# Patient Record
Sex: MALE | Race: WHITE | NOT HISPANIC OR LATINO | Employment: FULL TIME | URBAN - NONMETROPOLITAN AREA
[De-identification: names, ages, dates, MRNs, and addresses within clinical notes are randomized per-mention and may not be internally consistent; named-entity substitution may affect disease eponyms.]

---

## 2021-03-16 ENCOUNTER — IMMUNIZATION (OUTPATIENT)
Dept: VACCINE CLINIC | Facility: HOSPITAL | Age: 56
End: 2021-03-16

## 2021-03-16 PROCEDURE — 91300 HC SARSCOV02 VAC 30MCG/0.3ML IM: CPT | Performed by: INTERNAL MEDICINE

## 2021-03-16 PROCEDURE — 0001A: CPT | Performed by: INTERNAL MEDICINE

## 2021-04-06 ENCOUNTER — IMMUNIZATION (OUTPATIENT)
Dept: VACCINE CLINIC | Facility: HOSPITAL | Age: 56
End: 2021-04-06

## 2021-04-06 PROCEDURE — 91300 HC SARSCOV02 VAC 30MCG/0.3ML IM: CPT | Performed by: INTERNAL MEDICINE

## 2021-04-06 PROCEDURE — 0002A: CPT | Performed by: INTERNAL MEDICINE

## 2022-04-15 ENCOUNTER — OFFICE VISIT (OUTPATIENT)
Dept: INTERNAL MEDICINE | Facility: CLINIC | Age: 57
End: 2022-04-15

## 2022-04-15 VITALS
BODY MASS INDEX: 26.92 KG/M2 | TEMPERATURE: 97.3 F | SYSTOLIC BLOOD PRESSURE: 130 MMHG | HEART RATE: 91 BPM | HEIGHT: 70 IN | OXYGEN SATURATION: 97 % | WEIGHT: 188 LBS | DIASTOLIC BLOOD PRESSURE: 82 MMHG

## 2022-04-15 DIAGNOSIS — E78.5 HYPERLIPIDEMIA, UNSPECIFIED HYPERLIPIDEMIA TYPE: ICD-10-CM

## 2022-04-15 DIAGNOSIS — I10 ESSENTIAL HYPERTENSION: ICD-10-CM

## 2022-04-15 DIAGNOSIS — E11.65 TYPE 2 DIABETES MELLITUS WITH HYPERGLYCEMIA, WITHOUT LONG-TERM CURRENT USE OF INSULIN: ICD-10-CM

## 2022-04-15 DIAGNOSIS — N52.9 ERECTILE DYSFUNCTION, UNSPECIFIED ERECTILE DYSFUNCTION TYPE: Primary | ICD-10-CM

## 2022-04-15 DIAGNOSIS — Z12.11 COLON CANCER SCREENING: ICD-10-CM

## 2022-04-15 PROCEDURE — 99204 OFFICE O/P NEW MOD 45 MIN: CPT | Performed by: STUDENT IN AN ORGANIZED HEALTH CARE EDUCATION/TRAINING PROGRAM

## 2022-04-15 RX ORDER — AMLODIPINE AND VALSARTAN 5; 320 MG/1; MG/1
1 TABLET ORAL DAILY
Qty: 90 TABLET | Refills: 1 | Status: SHIPPED | OUTPATIENT
Start: 2022-04-15 | End: 2022-08-15 | Stop reason: SDUPTHER

## 2022-04-15 RX ORDER — TADALAFIL 10 MG/1
10 TABLET ORAL DAILY PRN
Qty: 30 TABLET | Refills: 1 | Status: SHIPPED | OUTPATIENT
Start: 2022-04-15 | End: 2022-07-05 | Stop reason: SDUPTHER

## 2022-04-15 RX ORDER — TADALAFIL 5 MG/1
TABLET ORAL
COMMUNITY
End: 2022-04-15

## 2022-04-15 RX ORDER — ROSUVASTATIN CALCIUM 10 MG/1
10 TABLET, COATED ORAL DAILY
COMMUNITY

## 2022-04-15 RX ORDER — SITAGLIPTIN AND METFORMIN HYDROCHLORIDE 1000; 50 MG/1; MG/1
1 TABLET, FILM COATED, EXTENDED RELEASE ORAL DAILY
COMMUNITY
End: 2022-08-15 | Stop reason: SDUPTHER

## 2022-04-15 RX ORDER — AMLODIPINE AND VALSARTAN 5; 320 MG/1; MG/1
TABLET ORAL
COMMUNITY
Start: 2022-03-01 | End: 2022-04-15 | Stop reason: SDUPTHER

## 2022-04-15 NOTE — PROGRESS NOTES
Bakari Quevedo D.O.  Internal Medicine  [facility]  [address]  [place] [address]  [phone]      Chief Complaint  Establish Care, Diabetes, Hypertension, and Hyperlipidemia    SUBJECTIVE    History of Present Illness    Zenon Kahn is a 56 y.o. male who presents to the office today as a new patient to establish care.     Pt previously saw Dr Alisa Tamayo who was a [facility] medicine physician, last visit early 2021.     HTN: takes amlodipine - valsartan 5-320 mg daily, checks his BP at home once weekly and gets numbers 130 or lower systolic and 80 or lower diastolic.    Type 2 Diabetes: at least had for 10-12 years, checks his blood sugar one time weekly at home. Takes empagliflozin 25 mg daily and Janumet XR  one in the morning and one in the evening. Last year his A1c was 8.2 and empagliflozin was added at that time.     HLD: taking rosuvastatin 10 mg daily for primary prevention    Erectile dysfunction: takes tadalafil 5 mg every day but doesn't feel that he always gets a complete response, is interested if a higher dose is available.     Left femur enchondroma: follows with [facility] and they are monitoring it with periodic xray      No Known Allergies     Outpatient Medications Marked as Taking for the 4/15/22 encounter (Office Visit) with Bakari Quevedo, DO   Medication Sig Dispense Refill   • amLODIPine-valsartan (EXFORGE) 5-320 MG per tablet Take 1 tablet by mouth Daily. 90 tablet 1   • empagliflozin (JARDIANCE) 25 MG tablet tablet Take 1 tablet by mouth Daily. 30 tablet 2   • glucose blood test strip Accu-Chek Thalia Plus test strips     • rosuvastatin (CRESTOR) 10 MG tablet rosuvastatin 10 mg tablet     • SITagliptin-metFORMIN HCl ER (Janumet XR)  MG tablet Janumet XR 50 mg-1,000 mg tablet,extended release     • [DISCONTINUED] amLODIPine-valsartan (EXFORGE) 5-320 MG per tablet      • [DISCONTINUED] empagliflozin (JARDIANCE) 25 MG  "tablet tablet Jardiance 25 mg tablet     • [DISCONTINUED] tadalafil (CIALIS) 5 MG tablet tadalafil 5 mg tablet          Past Medical History:   Diagnosis Date   • Enchondroma of left femur    • Erectile dysfunction    • Hyperlipidemia    • Hypertension    • Neuroblastoma (HCC)     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 He has never used smokeless tobacco. He reports current alcohol use of about 6.0 standard drinks of alcohol per week. He reports that he does not use drugs.    OBJECTIVE    Vital Signs:   /82   Pulse 91   Temp 97.3 °F (36.3 °C) (Temporal)   Ht 177.8 cm (70\")   Wt 85.3 kg (188 lb)   SpO2 97%   BMI 26.98 kg/m²     Physical Exam  Vitals reviewed.   Constitutional:       General: He is not in acute distress.     Appearance: Normal appearance. He is not ill-appearing.   HENT:      Head: Atraumatic.   Eyes:      General: No scleral icterus.  Cardiovascular:      Rate and Rhythm: Normal rate and regular rhythm.      Heart sounds: Normal heart sounds. No murmur heard.  Pulmonary:      Effort: Pulmonary effort is normal. No respiratory distress.      Breath sounds: Normal breath sounds. No wheezing or rhonchi.   Musculoskeletal:      Right lower leg: No edema.      Left lower leg: No edema.   Neurological:      Mental Status: He is alert.   Psychiatric:         Mood and Affect: Mood normal.         Behavior: Behavior normal.         Thought Content: Thought content normal.                             ASSESSMENT & PLAN     Diagnoses and all orders for this visit:    1. Erectile dysfunction, " unspecified erectile dysfunction type (Primary)  -Currently taking daily tadalafil 5 mg with partial but incomplete response.  -Advised patient that we could switch to a as needed dosage of higher dosage of 10 mg at least 30 minutes prior to sexual activity and that he may notice a benefit of this medicine 24 to 36 hours beyond taking it.  He is agreeable to try this so I will call in tadalafil 10 mg daily.  Continue to monitor.  -     tadalafil (Cialis) 10 MG tablet; Take 1 tablet by mouth Daily As Needed for Erectile Dysfunction. Take at least 30 minutes prior to anticipated sexual activity as one single dose and not more than once daily.  Dispense: 30 tablet; Refill: 1    2. Type 2 diabetes mellitus with hyperglycemia, without long-term current use of insulin ([facility])  -reports last A1c one year ago at 8.2  -Goal A1c for this patient is less than 6.5%  -Current diabetes regimen: Empagliflozin 25 mg daily and Janumet XR  mg twice daily.  -Summary of patient's most recent blood glucose trends at home: He checks blood sugar randomly about once per week and gets numbers in the 150s to 200s.  -Changes made to diabetes regimen today: I will obtain an A1c today and further changes to his regimen will be based off that  -Microalbuminuria screen: Obtained today    3. Essential hypertension  -Currently taking amlodipine-valsartan 5-320 mg daily  -/82 in office today and at home he reports control at least is good or better.  Continue current regimen.  -Recheck renal function and electrolytes today.  -     amLODIPine-valsartan (EXFORGE) 5-320 MG per tablet; Take 1 tablet by mouth Daily.  Dispense: 90 tablet; Refill: 1    4. Hyperlipidemia, unspecified hyperlipidemia type  -Currently taking rosuvastatin 10 mg daily for primary prevention.  Repeat fasting lipid profile today at.  -     Lipid Panel With LDL/HDL Ratio            The following social determinates of health impact the patient's medical decision making:  No social determinates of health were factored in to today's visit.     Follow Up  Return in about 4 weeks (around 5/13/2022) for Annual physical.    Patient/family had no further questions at this time and verbalized understanding of the plan discussed today.

## 2022-04-16 LAB
ALBUMIN SERPL-MCNC: 4.5 G/DL (ref 3.8–4.9)
ALBUMIN/CREAT UR: 17 MG/G CREAT (ref 0–29)
ALBUMIN/GLOB SERPL: 2 {RATIO} (ref 1.2–2.2)
ALP SERPL-CCNC: 75 IU/L (ref 44–121)
ALT SERPL-CCNC: 15 IU/L (ref 0–44)
AST SERPL-CCNC: 14 IU/L (ref 0–40)
BASOPHILS # BLD AUTO: 0.1 X10E3/UL (ref 0–0.2)
BASOPHILS NFR BLD AUTO: 1 %
BILIRUB SERPL-MCNC: 0.7 MG/DL (ref 0–1.2)
BUN SERPL-MCNC: 10 MG/DL (ref 6–24)
BUN/CREAT SERPL: 11 (ref 9–20)
CALCIUM SERPL-MCNC: 9.3 MG/DL (ref 8.7–10.2)
CHLORIDE SERPL-SCNC: 94 MMOL/L (ref 96–106)
CHOLEST SERPL-MCNC: 161 MG/DL (ref 100–199)
CO2 SERPL-SCNC: 20 MMOL/L (ref 20–29)
CREAT SERPL-MCNC: 0.9 MG/DL (ref 0.76–1.27)
CREAT UR-MCNC: 56.4 MG/DL
EGFRCR SERPLBLD CKD-EPI 2021: 100 ML/MIN/1.73
EOSINOPHIL # BLD AUTO: 0.2 X10E3/UL (ref 0–0.4)
EOSINOPHIL NFR BLD AUTO: 3 %
ERYTHROCYTE [DISTWIDTH] IN BLOOD BY AUTOMATED COUNT: 12.7 % (ref 11.6–15.4)
GLOBULIN SER CALC-MCNC: 2.2 G/DL (ref 1.5–4.5)
GLUCOSE SERPL-MCNC: 169 MG/DL (ref 65–99)
HBA1C MFR BLD: 8.3 % (ref 4.8–5.6)
HCT VFR BLD AUTO: 44.6 % (ref 37.5–51)
HDLC SERPL-MCNC: 61 MG/DL
HGB BLD-MCNC: 15.3 G/DL (ref 13–17.7)
IMM GRANULOCYTES # BLD AUTO: 0 X10E3/UL (ref 0–0.1)
IMM GRANULOCYTES NFR BLD AUTO: 1 %
LDLC SERPL CALC-MCNC: 83 MG/DL (ref 0–99)
LDLC/HDLC SERPL: 1.4 RATIO (ref 0–3.6)
LYMPHOCYTES # BLD AUTO: 1.3 X10E3/UL (ref 0.7–3.1)
LYMPHOCYTES NFR BLD AUTO: 21 %
MCH RBC QN AUTO: 31 PG (ref 26.6–33)
MCHC RBC AUTO-ENTMCNC: 34.3 G/DL (ref 31.5–35.7)
MCV RBC AUTO: 91 FL (ref 79–97)
MICROALBUMIN UR-MCNC: 9.7 UG/ML
MONOCYTES # BLD AUTO: 0.7 X10E3/UL (ref 0.1–0.9)
MONOCYTES NFR BLD AUTO: 11 %
NEUTROPHILS # BLD AUTO: 4 X10E3/UL (ref 1.4–7)
NEUTROPHILS NFR BLD AUTO: 63 %
PLATELET # BLD AUTO: 222 X10E3/UL (ref 150–450)
POTASSIUM SERPL-SCNC: 5.1 MMOL/L (ref 3.5–5.2)
PROT SERPL-MCNC: 6.7 G/DL (ref 6–8.5)
RBC # BLD AUTO: 4.93 X10E6/UL (ref 4.14–5.8)
SODIUM SERPL-SCNC: 133 MMOL/L (ref 134–144)
TRIGL SERPL-MCNC: 91 MG/DL (ref 0–149)
VLDLC SERPL CALC-MCNC: 17 MG/DL (ref 5–40)
WBC # BLD AUTO: 6.3 X10E3/UL (ref 3.4–10.8)

## 2022-04-16 RX ORDER — GLIPIZIDE 5 MG/1
5 TABLET ORAL
Qty: 180 TABLET | Refills: 1 | Status: SHIPPED | OUTPATIENT
Start: 2022-04-16 | End: 2022-05-20

## 2022-04-22 ENCOUNTER — PATIENT ROUNDING (BHMG ONLY) (OUTPATIENT)
Dept: INTERNAL MEDICINE | Facility: CLINIC | Age: 57
End: 2022-04-22

## 2022-04-22 NOTE — PROGRESS NOTES
April 22, 2022      LYAA JIMENEZ [facility]  [address] [address] [phone].        Information collected during check out    Overall were you satisfied with your first visit to our practice? Yes       I appreciate you taking the time to speak with me today. Is there anything else I can do for you? No      Thank you, and have a great day.

## 2022-04-27 ENCOUNTER — TELEPHONE (OUTPATIENT)
Dept: INTERNAL MEDICINE | Facility: CLINIC | Age: 57
End: 2022-04-27

## 2022-04-27 NOTE — TELEPHONE ENCOUNTER
Caller: Zenon Kahn    Relationship: Self    Best call back number: [phone]    What orders are you requesting (i.e. lab or imaging): COLORGUARD    In what timeframe would the patient need to come in:     Where will you receive your lab/imaging services:     Additional notes: PATIENT WAS IN TO SEE DR CARVER AND SPOKE ABOUT A COLORGUARD TEST OR A COLONOSCOPY.  PATIENT WANTS TO DO THE COLORGUARD TESTING.

## 2022-04-27 NOTE — TELEPHONE ENCOUNTER
Caller: Zenon Kahn    Relationship: Self    Best call back number: [phone]    Requested Prescriptions:   Requested Prescriptions     Pending Prescriptions Disp Refills   • glucose blood test strip       Sig: Use as instructed        Pharmacy where request should be sent:   [facility]  [address] [place] [address]  [phone]    Additional details provided by patient: PT SAYS THIS NEEDS TO BE FOR THE ACCU CHECK GUIDE TESTING DEVICE    Does the patient have less than a 3 day supply:  [] Yes  [x] No    Александр Salazar Rep   04/27/22 13:48 EDT

## 2022-04-28 DIAGNOSIS — E11.65 TYPE 2 DIABETES MELLITUS WITH HYPERGLYCEMIA, WITHOUT LONG-TERM CURRENT USE OF INSULIN: Primary | ICD-10-CM

## 2022-04-28 DIAGNOSIS — Z12.11 COLON CANCER SCREENING: Primary | ICD-10-CM

## 2022-05-19 ENCOUNTER — TELEPHONE (OUTPATIENT)
Dept: INTERNAL MEDICINE | Facility: CLINIC | Age: 57
End: 2022-05-19

## 2022-05-19 DIAGNOSIS — R19.5 POSITIVE COLORECTAL CANCER SCREENING USING COLOGUARD TEST: Primary | ICD-10-CM

## 2022-05-19 NOTE — TELEPHONE ENCOUNTER
Caller: Zenon Kahn    Relationship: Self    Best call back number: [phone]    What is the medical concern/diagnosis: COLOGUARD POSITIVE    What specialty or service is being requested: COLONOSCOPY ASAP

## 2022-05-20 ENCOUNTER — OFFICE VISIT (OUTPATIENT)
Dept: INTERNAL MEDICINE | Facility: CLINIC | Age: 57
End: 2022-05-20

## 2022-05-20 VITALS
TEMPERATURE: 97.8 F | HEIGHT: 70 IN | HEART RATE: 112 BPM | BODY MASS INDEX: 25.48 KG/M2 | DIASTOLIC BLOOD PRESSURE: 90 MMHG | WEIGHT: 178 LBS | OXYGEN SATURATION: 95 % | SYSTOLIC BLOOD PRESSURE: 132 MMHG

## 2022-05-20 DIAGNOSIS — Z00.00 ANNUAL PHYSICAL EXAM: Primary | ICD-10-CM

## 2022-05-20 DIAGNOSIS — Z12.5 PROSTATE CANCER SCREENING: ICD-10-CM

## 2022-05-20 DIAGNOSIS — F41.9 ANXIETY AND DEPRESSION: ICD-10-CM

## 2022-05-20 DIAGNOSIS — F32.A ANXIETY AND DEPRESSION: ICD-10-CM

## 2022-05-20 DIAGNOSIS — E11.65 TYPE 2 DIABETES MELLITUS WITH HYPERGLYCEMIA, WITHOUT LONG-TERM CURRENT USE OF INSULIN: ICD-10-CM

## 2022-05-20 DIAGNOSIS — I10 ESSENTIAL HYPERTENSION: ICD-10-CM

## 2022-05-20 PROCEDURE — 93000 ELECTROCARDIOGRAM COMPLETE: CPT | Performed by: STUDENT IN AN ORGANIZED HEALTH CARE EDUCATION/TRAINING PROGRAM

## 2022-05-20 PROCEDURE — 99214 OFFICE O/P EST MOD 30 MIN: CPT | Performed by: STUDENT IN AN ORGANIZED HEALTH CARE EDUCATION/TRAINING PROGRAM

## 2022-05-20 PROCEDURE — 99396 PREV VISIT EST AGE 40-64: CPT | Performed by: STUDENT IN AN ORGANIZED HEALTH CARE EDUCATION/TRAINING PROGRAM

## 2022-05-20 RX ORDER — AMITRIPTYLINE HYDROCHLORIDE 25 MG/1
25 TABLET, FILM COATED ORAL NIGHTLY
Qty: 30 TABLET | Refills: 2 | Status: SHIPPED | OUTPATIENT
Start: 2022-05-20 | End: 2022-08-24

## 2022-05-20 RX ORDER — GLIPIZIDE 5 MG/1
5 TABLET ORAL
Qty: 180 TABLET | Refills: 1
Start: 2022-05-20 | End: 2022-08-24

## 2022-05-20 NOTE — PROGRESS NOTES
Bakari Quevedo D.O.  Internal Medicine  [facility]  [address], [address]  [place] [address]  [phone]    Chief Complaint  Annual checkup    HISTORY    Zenon Kahn is a 56 y.o. male who presents to the office today as a  an established patient for their annual preventative exam.      No hospitalization(s) within the last year.     Current exercise regimen: walks his dog and just got his row bike back from repairs    Status of chronic medical conditions:    HTN: takes amlodipine - valsartan 5-320 mg daily. States he is under a tremendous amount of stress. Checks his BP 3 times weekly with 128-133 systolic , diastolic in the 80s usually.      Type 2 Diabetes:  Takes empagliflozin 25 mg daily and Janumet XR  one in the morning and one in the evening. Last A1c 4/2022 was 8.3 so glipizide 5 mg twice daily before meals was added but had sweating, a little dizziness , cold sweats after the evening pill. So he stopped taking it after 4 days.     HLD: taking rosuvastatin 10 mg daily for primary prevention     Erectile dysfunction: takes tadalafil 10 mg daily as needed     Left femur enchondroma: follows with [facility] and they are monitoring it with periodic xray     Anxiety and Depression: remembers being on wellbutrin, trintellix, buspirone, zoloft. Had side effects of yawning around 4 PM every day with many of those medications and the others didn't significantly work. States he still feels pretty bad in terms of his mood and anxiety, recently finalized divorce and that is also weighing heavily on him. States he has thought about reaching out to counselor and wants to see if I have any recommendations. Is also open to trying another medication.       Any other concerns regarding their health today:     Health Maintenance Summary          Overdue - Pneumococcal Vaccine 0-64 (1 - PCV) Overdue - never done    No completion history exists for this topic.          Overdue  - Hepatitis B (1 of 3 - Risk 3-dose series) Overdue - never done    No completion history exists for this topic.          Overdue - ZOSTER VACCINE (1 of 2) Overdue since 10/8/2015    12/17/2020  Imm Admin: Zoster, Unspecified    08/20/2020  Imm Admin: Zoster, Unspecified          Overdue - DIABETIC FOOT EXAM (Yearly) Overdue - never done    No completion history exists for this topic.          Ordered - DIABETIC EYE EXAM (Yearly) Ordered on 5/20/2022    No completion history exists for this topic.          INFLUENZA VACCINE (Yearly - August to March) Next due on 8/1/2022 02/28/2022  Imm Admin: Flublok 18+yrs    11/28/2021  Imm Admin: Flublok 18+yrs    12/17/2020  Imm Admin: Influenza, Unspecified    01/31/2019  Imm Admin: Flu Vaccine Split Quad    01/31/2019  Imm Admin: FluLaval/Fluarix/Fluzone >6          HEMOGLOBIN A1C (Every 6 Months) Next due on 10/15/2022    04/15/2022  Hemoglobin A1C component of Hemoglobin A1c    07/30/2020  Hemoglobin A1C component of HEMOGLOBIN A1C    01/31/2019  Hemoglobin A1C component of HEMOGLOBIN A1C    10/27/2014  Hemoglobin A1C component of Hemoglobin A1c    05/13/2014  Hemoglobin A1C component of Hemoglobin A1c          LIPID PANEL (Yearly) Next due on 4/15/2023    04/15/2022  Lipid Panel With LDL/HDL Ratio    07/31/2020  LIPID PANEL    01/31/2019  LIPID PANEL    10/27/2014  Lipid panel    05/13/2014  Lipid panel          URINE MICROALBUMIN (Yearly) Next due on 4/15/2023    04/15/2022  Multiple components of Microalbumin / Creatinine Urine Ratio - Urine, Clean Catch    10/27/2014  Multiple components of Microalbumin / creatinine urine ratio    05/13/2014  Multiple components of Microalbumin / creatinine urine ratio          ANNUAL PHYSICAL (Yearly) Next due on 5/21/2023 05/20/2022  Done          COLORECTAL CANCER SCREENING (COLOGUARD - Every 3 Years) Next due on 5/19/2025 05/19/2022  Visit Dx: Positive colorectal cancer screening using Cologuard test          TDAP/TD  VACCINES (2 - Td or Tdap) Next due on 2/7/2029 02/07/2019  Imm Admin: Tdap          HEPATITIS C SCREENING  Completed    01/31/2019  HEPATITIS C ANTIBODY          COVID-19 Vaccine (Series Information) Completed    10/07/2021  Imm Admin: COVID-19 (PFIZER) PURPLE CAP    04/06/2021  Imm Admin: COVID-19 (PFIZER) PURPLE CAP    03/16/2021  Imm Admin: COVID-19 (PFIZER) PURPLE CAP                 No Known Allergies     Outpatient Medications Marked as Taking for the 5/20/22 encounter (Office Visit) with Bakari Quevedo DO   Medication Sig Dispense Refill   • amLODIPine-valsartan (EXFORGE) 5-320 MG per tablet Take 1 tablet by mouth Daily. 90 tablet 1   • empagliflozin (JARDIANCE) 25 MG tablet tablet Take 1 tablet by mouth Daily. 30 tablet 2   • glipizide (GLUCOTROL) 5 MG tablet Take 1 tablet by mouth Every Morning Before Breakfast. Take 30 minutes before eating. 180 tablet 1   • glucose blood test strip Accu-Chek Thalia Plus test strips     • glucose blood test strip ACCU CHECK GUIDE strips. Use to check blood glucose up to 2 times daily. 100 each 11   • rosuvastatin (CRESTOR) 10 MG tablet rosuvastatin 10 mg tablet     • SITagliptin-metFORMIN HCl ER (Janumet XR)  MG tablet Janumet XR 50 mg-1,000 mg tablet,extended release     • tadalafil (Cialis) 10 MG tablet Take 1 tablet by mouth Daily As Needed for Erectile Dysfunction. Take at least 30 minutes prior to anticipated sexual activity as one single dose and not more than once daily. 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   • Type 2 diabetes mellitus, without long-term current use of insulin (HCC)        Immunization History   Administered Date(s) Administered   • COVID-19 (PFIZER) PURPLE CAP 03/16/2021, 04/06/2021, 10/07/2021   • Flu Vaccine Split Quad 01/31/2019   •  "FluLaval/Fluarix/Fluzone >6 01/31/2019   • Flublok 18+yrs 11/28/2021, 02/28/2022   • Hepatitis A 08/20/2020   • Influenza, Unspecified 12/17/2020   • Tdap 02/07/2019   • Zoster, Unspecified 08/20/2020, 12/17/2020        OBJECTIVE    Vital Signs:   /90   Pulse 112   Temp 97.8 °F (36.6 °C) (Temporal)   Ht 177.8 cm (70\")   Wt 80.7 kg (178 lb)   SpO2 95%   BMI 25.54 kg/m²     Physical Exam  Constitutional:       General: He is not in acute distress.     Appearance: Normal appearance. He is not ill-appearing.   HENT:      Head: Normocephalic and atraumatic.      Right Ear: Tympanic membrane, ear canal and external ear normal. There is no impacted cerumen.      Left Ear: Tympanic membrane, ear canal and external ear normal. There is no impacted cerumen.      Mouth/Throat:      Mouth: Mucous membranes are moist.      Pharynx: No oropharyngeal exudate or posterior oropharyngeal erythema.   Eyes:      General: No scleral icterus.     Extraocular Movements: Extraocular movements intact.      Conjunctiva/sclera: Conjunctivae normal.      Pupils: Pupils are equal, round, and reactive to light.   Cardiovascular:      Rate and Rhythm: Normal rate and regular rhythm.      Heart sounds: Normal heart sounds. No murmur heard.  Pulmonary:      Effort: Pulmonary effort is normal. No respiratory distress.      Breath sounds: Normal breath sounds. No wheezing.   Abdominal:      General: Bowel sounds are normal. There is no distension.      Palpations: Abdomen is soft.      Tenderness: There is no abdominal tenderness. There is no guarding.   Musculoskeletal:      Cervical back: Neck supple. No tenderness.      Right lower leg: No edema.      Left lower leg: No edema.   Lymphadenopathy:      Cervical: No cervical adenopathy.   Skin:     General: Skin is warm and dry.      Coloration: Skin is not jaundiced.   Neurological:      General: No focal deficit present.      Mental Status: He is alert and oriented to person, place, and " time.      Cranial Nerves: No cranial nerve deficit.      Motor: No weakness.   Psychiatric:         Mood and Affect: Mood normal.         Behavior: Behavior normal.         Thought Content: Thought content normal.          The following data was reviewed by: Bakari Quevedo DO on 05/20/2022:  Common labs    Common Labsle 4/15/22 4/15/22 4/15/22 4/15/22 4/15/22    1118 1118 1118 1118 1118   Glucose   169 (A)     BUN   10     Creatinine   0.90     Sodium   133 (A)     Potassium   5.1     Chloride   94 (A)     Calcium   9.3     Total Protein   6.7     Albumin   4.5     Total Bilirubin   0.7     Alkaline Phosphatase   75     AST (SGOT)   14     ALT (SGPT)   15     WBC    6.3    Hemoglobin    15.3    Hematocrit    44.6    Platelets    222    Total Cholesterol     161   Triglycerides     91   HDL Cholesterol     61   LDL Cholesterol      83   Hemoglobin A1C  8.3 (A)      Microalbumin, Urine 9.7       (A) Abnormal value       Comments are available for some flowsheets but are not being displayed.                      ECG 12 Lead    Date/Time: 5/20/2022 10:42 AM  Performed by: Bakari Quevedo DO  Authorized by: Bakari Quevedo DO   Previous ECG: no previous ECG available  Rhythm: sinus rhythm  Rate: normal  Rate comments: 92  Conduction: conduction normal  QRS axis: left  Other findings: early repolarization    Clinical impression: abnormal EKG  Clinical impression comment: sinus rhythm with Left axis deviation and early repolarization                ASSESSMENT & PLAN     1. Annual Preventative Health Examination  -Age and sex appropriate physical exam performed and documented. Updated past medical, family, social and surgical histories as well as allergies and care team list. Addressed care gaps listed in the medical record.  -Encouraged seat belt use for every car ride for patient and all occupants. Encouraged sunscreen use to reduce risk of skin cancer for any days with sun exposure over 20 minutes.  Discussed the importance of  smoke and carbon monoxide detectors in the home.   -Encouraged annual dental and vision exams as part of their overall health.  -Encouraged minimum of 30 minutes or more of exercise at a brisk walk or higher 5 days per week combined with a well-balanced diet.   -Immunizations reviewed and updated in EMR. Recommended the following vaccines for the patient: Pneumonia vaccine, patient prefers to do next visit.   -Lipid screening:   Lipid Panel    Lipid Panel 4/15/22   Total Cholesterol 161   Triglycerides 91   HDL Cholesterol 61   VLDL Cholesterol 17   LDL Cholesterol  83   LDL/HDL Ratio 1.4      Comments are available for some flowsheets but are not being displayed.          Patient is already on statin therapy.   -Aspirin for primary or secondary prevention: ASCVD risk calculate and aspirin for primary prevention is not indicated.  -Depression screening: known history of anxiety and depression  -Diabetes screening:  Patient already has a diagnosis of diabetes mellitus and is being treated.   -Tobacco use screening: Patient denies cigarette use. Tobacco counseling was was not indicated.  -Alcohol use screening: Patient reports drinking 6 drinks per week.. Alcohol abuse counseling was was not indicated.  -Illicit drug screening: Patient does not use illicit drugs.   -Abdominal aortic aneurysm screening: AAA screening is not indicated as patient is less than 65 years of age.  -Hypertension screening: Patient with known diagnosis of hypertension and is receiving treatment.  -HIV screening: Discussed with patient the importance of identifying asymptomatic HIV infection for adults in their age group with a one time screening test .Patient declined screening.   -Hepatitis C virus screening:  Patient has already completed Hepatitis C screening. Negative screening on file.   -Syphilis screening: Syphilis screening not indicated.  -Hepatitis B virus screening: Screening not indicated, not in a high-risk group.  -Colon cancer  screening: + cologuard , colonoscopy pending  -Lung cancer screening: Patient has never smoked.  -Prostate cancer screening: Last PSA test was 2.41  on 1/2019 and re-screening is indicated every 2 years. Repeat today.      Follow up in 1 year for annual physical exam.    A problem-based visit was also conducted on the same day, see below for assessment and plan    Diagnoses and all orders for this visit:    1. Type 2 diabetes mellitus with hyperglycemia, without long-term current use of insulin (HCC) (Primary)  -Takes empagliflozin 25 mg daily and Janumet XR  one in the morning and one in the evening. Last A1c 4/2022 was 8.3 so glipizide 5 mg twice daily before meals was added but had sweating, a little dizziness , cold sweats after the evening pill. So he stopped taking it after 4 days.   -symptoms consistent with hypoglycemia, recommend he take glipizide once daily only as the evening dose is what precipitated his symptoms  -     glipizide (GLUCOTROL) 5 MG tablet; Take 1 tablet by mouth Every Morning Before Breakfast. Take 30 minutes before eating.  Dispense: 180 tablet; Refill: 1  -     Ambulatory Referral for Diabetic Eye Exam-Ophthalmology    2. Essential hypertension  - takes amlodipine - valsartan 5-320 mg daily. States he is under a tremendous amount of stress. Checks his BP 3 times weekly with 128-133 systolic , diastolic in the 80s usually.        -advised pt that goal BP is less than 130 systolic and less than 80 diastolic       -in office BP is 132/90 today, slightly uncontrolled       -would like to focus on lifestyle modifications first. Recommended lifestyle modifications including maintaining a normal weight, increasing exercise to 30 minutes five times weekly at a brisk walk or more at least, limiting sodium intake to less than 1500 mg daily, following the DASH eating plan (plan provided to patient today), limiting alcohol intake to less than 1-2 drinks daily.     -continue to monitor for  need to increase antihypertensive regimen    3. Anxiety and depression  -remembers being on wellbutrin, trintellix, buspirone, zoloft. Had side effects of yawning unfortunately that limited use.  -PHQ9 is 14 and GAD7 is 20 in office, moderate to severe symptoms overall. No SI reported.   -provided pt with listing of in person and virtual mental health counselors as well as self directed mental health apps such as [employer], [employer], etc. Strongly encouraged he incorporate one of these into his mental health plan.  -will check TSH to rule out organic disease        -at this point given failure of multiple serotonergic agents I believe moving to a TCA such as amitriptyline is reasonable. Side effects discussed. EKG completed for QTC check and it was normal at 410. Will start amitriptyline 25 mg nightly.    -     TSH Rfx On Abnormal To Free T4  -     ECG 12 Lead            The following social determinates of health impact the patient's medical decision making: No social determinates of health were factored in to today's visit.     Follow Up  Return in about 3 months (around 8/20/2022) for Recheck.      Patient/family had no further questions at this time and verbalized understanding of the plan discussed today.

## 2022-05-21 LAB
PSA SERPL-MCNC: 3 NG/ML (ref 0–4)
TSH SERPL DL<=0.005 MIU/L-ACNC: 1.2 UIU/ML (ref 0.45–4.5)

## 2022-05-25 ENCOUNTER — OFFICE VISIT (OUTPATIENT)
Dept: SURGERY | Facility: CLINIC | Age: 57
End: 2022-05-25

## 2022-05-25 VITALS — WEIGHT: 178.2 LBS | BODY MASS INDEX: 25.51 KG/M2 | HEIGHT: 70 IN

## 2022-05-25 DIAGNOSIS — R19.5 POSITIVE COLORECTAL CANCER SCREENING USING COLOGUARD TEST: Primary | ICD-10-CM

## 2022-05-25 PROCEDURE — 99204 OFFICE O/P NEW MOD 45 MIN: CPT | Performed by: SURGERY

## 2022-05-25 NOTE — PROGRESS NOTES
General Surgery H&P/Consultation    Impression/Plan:    Mr. Zenon Kahn is a 56 y.o. with positive Cologuard screening.  I discussed with him how the Cologuard screening test functions and following a positive screen the recommendation is to proceed with diagnostic colonoscopy. All risks (including bleeding, infection, damage to surrounding structures), benefits, and alternatives were explained to the patient who agreed and wished to proceed.    Referring Provider: No ref. provider found    Chief Complaint:    Positive Cologuard screen    History of Present Illness:    Mr. Zenon Kahn is a 56 y.o. 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  He denies melena or hematochezia.  However he has noted more loose stools over the last year which he attributes to a change in diet.  He does not have a family history of colon cancer but does report that his mother had primary liver cancer which metastasized to the colon requiring resection.  He has never had a colonoscopy previously.    Past Medical History:   Past Medical History:   Diagnosis Date   • Anxiety and depression    • Enchondroma of left femur    • Erectile dysfunction    • Gout    • History of transfusion 4/15/66    When neuroblastoma surgery was preformed   • Hx of inguinal hernia repair    • Hyperlipidemia    • Hypertension    • Neuroblastoma (HCC)     with removal in childhood   • Type 2 diabetes mellitus, without long-term current use of insulin (HCC)         Past Surgical History:    Past Surgical History:   Procedure Laterality Date   • INGUINAL HERNIA REPAIR Left          Family History:    Family History   Problem Relation Age of Onset   • Cancer Mother         Carnoid tumor of the liver   • Heart attack Father    • Peripheral vascular disease Father    • Hypertension Father    • Hyperlipidemia Father    • Alcohol abuse Father    • Early death Father         57 years old   • Diabetes  Maternal Grandmother          Social History:    Social History     Socioeconomic History   • Marital status:      Spouse name: Shasta   • Number of children: 1   Tobacco Use   • Smoking status: Never Smoker   • Smokeless tobacco: Never Used   Substance and Sexual Activity   • Alcohol use: Yes     Alcohol/week: 10.0 standard drinks     Types: 10 Standard drinks or equivalent per week   • Drug use: Never   • Sexual activity: Not Currently     Partners: Female     Birth control/protection: None         Allergies:   No Known Allergies    Medications:     Current Outpatient Medications:   •  amitriptyline (ELAVIL) 25 MG tablet, Take 1 tablet by mouth Every Night., Disp: 30 tablet, Rfl: 2  •  amLODIPine-valsartan (EXFORGE) 5-320 MG per tablet, Take 1 tablet by mouth Daily., Disp: 90 tablet, Rfl: 1  •  empagliflozin (JARDIANCE) 25 MG tablet tablet, Take 1 tablet by mouth Daily., Disp: 30 tablet, Rfl: 2  •  glipizide (GLUCOTROL) 5 MG tablet, Take 1 tablet by mouth Every Morning Before Breakfast. Take 30 minutes before eating., Disp: 180 tablet, Rfl: 1  •  glucose blood test strip, Accu-Chek Thalia Plus test strips, Disp: , Rfl:   •  glucose blood test strip, ACCU CHECK GUIDE strips. Use to check blood glucose up to 2 times daily., Disp: 100 each, Rfl: 11  •  rosuvastatin (CRESTOR) 10 MG tablet, rosuvastatin 10 mg tablet, Disp: , Rfl:   •  SITagliptin-metFORMIN HCl ER (Janumet XR)  MG tablet, Janumet XR 50 mg-1,000 mg tablet,extended release, Disp: , Rfl:   •  tadalafil (Cialis) 10 MG tablet, Take 1 tablet by mouth Daily As Needed for Erectile Dysfunction. Take at least 30 minutes prior to anticipated sexual activity as one single dose and not more than once daily., Disp: 30 tablet, Rfl: 1    Radiology/Endoscopy:    • No prior colonoscopy    Labs:    • Labs from 4/15/2022 reviewed, no evidence of anemia        Physical Exam:   • Constitutional: Well-developed well-nourished, no acute distress  • Eyes:  Conjunctiva normal, sclera nonicteric  • ENMT: Hearing grossly normal, oral mucosa moist  • Neck: Supple, trachea midline  • Respiratory: No increased work of breathing, normal inspiratory effort  • Cardiovascular: Regular rate, no jugular venous distention  • Skin:  Warm, dry, no rash on visualized skin surfaces  • Musculoskeletal: Symmetric strength, normal gait  • Psychiatric: Alert and oriented ×3, normal affect       Stefan Chase MD  General and Endoscopic Surgery  [facility]    [address], [address]  [place], [place], [facility]  P: [phone]  F: [phone]

## 2022-06-02 ENCOUNTER — ANESTHESIA EVENT (OUTPATIENT)
Dept: GASTROENTEROLOGY | Facility: HOSPITAL | Age: 57
End: 2022-06-02

## 2022-06-02 ENCOUNTER — HOSPITAL ENCOUNTER (OUTPATIENT)
Facility: HOSPITAL | Age: 57
Setting detail: HOSPITAL OUTPATIENT SURGERY
Discharge: HOME | End: 2022-06-02
Attending: SURGERY | Admitting: SURGERY

## 2022-06-02 ENCOUNTER — ANESTHESIA (OUTPATIENT)
Dept: GASTROENTEROLOGY | Facility: HOSPITAL | Age: 57
End: 2022-06-02

## 2022-06-02 VITALS
SYSTOLIC BLOOD PRESSURE: 134 MMHG | HEART RATE: 93 BPM | RESPIRATION RATE: 18 BRPM | BODY MASS INDEX: 25.2 KG/M2 | DIASTOLIC BLOOD PRESSURE: 92 MMHG | WEIGHT: 176 LBS | HEIGHT: 70 IN | OXYGEN SATURATION: 97 %

## 2022-06-02 DIAGNOSIS — R19.5 POSITIVE COLORECTAL CANCER SCREENING USING COLOGUARD TEST: ICD-10-CM

## 2022-06-02 LAB — GLUCOSE BLDC GLUCOMTR-MCNC: 125 MG/DL (ref 70–130)

## 2022-06-02 PROCEDURE — 25010000002 GLUCAGON (RDNA) PER 1 MG: Performed by: SURGERY

## 2022-06-02 PROCEDURE — 25010000002 PHENYLEPHRINE 10 MG/ML SOLUTION: Performed by: NURSE ANESTHETIST, CERTIFIED REGISTERED

## 2022-06-02 PROCEDURE — 25010000002 PROPOFOL 10 MG/ML EMULSION: Performed by: NURSE ANESTHETIST, CERTIFIED REGISTERED

## 2022-06-02 PROCEDURE — 88305 TISSUE EXAM BY PATHOLOGIST: CPT | Performed by: SURGERY

## 2022-06-02 PROCEDURE — 45385 COLONOSCOPY W/LESION REMOVAL: CPT | Performed by: SURGERY

## 2022-06-02 PROCEDURE — 45381 COLONOSCOPY SUBMUCOUS NJX: CPT | Performed by: SURGERY

## 2022-06-02 PROCEDURE — 45380 COLONOSCOPY AND BIOPSY: CPT | Performed by: SURGERY

## 2022-06-02 PROCEDURE — 82962 GLUCOSE BLOOD TEST: CPT

## 2022-06-02 RX ORDER — SODIUM CHLORIDE 0.9 % (FLUSH) 0.9 %
10 SYRINGE (ML) INJECTION AS NEEDED
Status: DISCONTINUED | OUTPATIENT
Start: 2022-06-02 | End: 2022-06-07 | Stop reason: HOSPADM

## 2022-06-02 RX ORDER — PROPOFOL 10 MG/ML
VIAL (ML) INTRAVENOUS AS NEEDED
Status: DISCONTINUED | OUTPATIENT
Start: 2022-06-02 | End: 2022-06-02 | Stop reason: SURG

## 2022-06-02 RX ORDER — SODIUM CHLORIDE, SODIUM LACTATE, POTASSIUM CHLORIDE, CALCIUM CHLORIDE 600; 310; 30; 20 MG/100ML; MG/100ML; MG/100ML; MG/100ML
1000 INJECTION, SOLUTION INTRAVENOUS CONTINUOUS
Status: DISCONTINUED | OUTPATIENT
Start: 2022-06-02 | End: 2022-06-04 | Stop reason: HOSPADM

## 2022-06-02 RX ORDER — PHENYLEPHRINE HYDROCHLORIDE 10 MG/ML
INJECTION INTRAVENOUS AS NEEDED
Status: DISCONTINUED | OUTPATIENT
Start: 2022-06-02 | End: 2022-06-02 | Stop reason: SURG

## 2022-06-02 RX ORDER — LIDOCAINE HYDROCHLORIDE 20 MG/ML
INJECTION, SOLUTION INFILTRATION; PERINEURAL AS NEEDED
Status: DISCONTINUED | OUTPATIENT
Start: 2022-06-02 | End: 2022-06-02 | Stop reason: SURG

## 2022-06-02 RX ORDER — PROPOFOL 10 MG/ML
VIAL (ML) INTRAVENOUS CONTINUOUS PRN
Status: DISCONTINUED | OUTPATIENT
Start: 2022-06-02 | End: 2022-06-02 | Stop reason: SURG

## 2022-06-02 RX ADMIN — PHENYLEPHRINE HYDROCHLORIDE 100 MCG: 10 INJECTION, SOLUTION INTRAVENOUS at 14:34

## 2022-06-02 RX ADMIN — SODIUM CHLORIDE, POTASSIUM CHLORIDE, SODIUM LACTATE AND CALCIUM CHLORIDE 1000 ML: 600; 310; 30; 20 INJECTION, SOLUTION INTRAVENOUS at 13:20

## 2022-06-02 RX ADMIN — SODIUM CHLORIDE, POTASSIUM CHLORIDE, SODIUM LACTATE AND CALCIUM CHLORIDE: 600; 310; 30; 20 INJECTION, SOLUTION INTRAVENOUS at 14:47

## 2022-06-02 RX ADMIN — LIDOCAINE HYDROCHLORIDE 60 MG: 20 INJECTION, SOLUTION INFILTRATION; PERINEURAL at 14:03

## 2022-06-02 RX ADMIN — Medication 100 MG: at 14:03

## 2022-06-02 RX ADMIN — PROPOFOL 300 MCG/KG/MIN: 10 INJECTION, EMULSION INTRAVENOUS at 14:03

## 2022-06-02 RX ADMIN — PHENYLEPHRINE HYDROCHLORIDE 100 MCG: 10 INJECTION, SOLUTION INTRAVENOUS at 14:45

## 2022-06-02 NOTE — ANESTHESIA POSTPROCEDURE EVALUATION
"Patient: Zenon Kahn    Procedure Summary     Date: 06/02/22 Room / Location:  [facility]    Anesthesia Start: 1359 Anesthesia Stop: 1705    Procedure: COLONOSCOPY to cecum with hot snare and cold forceps polypectomies, saline lift, and tattoo at 85 cm and 30 cm (N/A ) Diagnosis:       Positive colorectal cancer screening using Cologuard test      (Positive colorectal cancer screening using Cologuard test [R19.5])    Surgeons: Stefan Chase MD Provider: Poonam Chavez MD    Anesthesia Type: MAC ASA Status: 3          Anesthesia Type: MAC    Vitals  No vitals data found for the desired time range.          Post Anesthesia Care and Evaluation    Pain management: adequate  Airway patency: patent  Anesthetic complications: No anesthetic complications    Cardiovascular status: acceptable  Respiratory status: acceptable  Hydration status: acceptable    Comments: /94 (BP Location: Left arm, Patient Position: Lying)   Pulse 101   Resp 17   Ht 177.8 cm (70\")   Wt 79.8 kg (176 lb)   SpO2 97%   BMI 25.25 kg/m²         "

## 2022-06-02 NOTE — ANESTHESIA PREPROCEDURE EVALUATION
Anesthesia Evaluation                  Airway   Mallampati: I  TM distance: >3 FB  Neck ROM: full  No difficulty expected  Dental - normal exam     Pulmonary - normal exam   Cardiovascular - normal exam    (+) hypertension 2 medications or greater, hyperlipidemia,       Neuro/Psych  (+) psychiatric history Anxiety and Depression,    GI/Hepatic/Renal/Endo    (+)   diabetes mellitus,     Musculoskeletal     Abdominal  - normal exam    Bowel sounds: normal.   Substance History      OB/GYN          Other      history of cancer remission                    Anesthesia Plan    ASA 3     MAC     intravenous induction     Anesthetic plan, all risks, benefits, and alternatives have been provided, discussed and informed consent has been obtained with: patient.        CODE STATUS:

## 2022-06-03 ENCOUNTER — PREP FOR SURGERY (OUTPATIENT)
Dept: OTHER | Facility: HOSPITAL | Age: 57
End: 2022-06-03

## 2022-06-03 ENCOUNTER — TELEPHONE (OUTPATIENT)
Dept: SURGERY | Facility: CLINIC | Age: 57
End: 2022-06-03

## 2022-06-03 DIAGNOSIS — R19.5 POSITIVE COLORECTAL CANCER SCREENING USING COLOGUARD TEST: Primary | ICD-10-CM

## 2022-06-03 NOTE — TELEPHONE ENCOUNTER
Left a voicemail for the patient to call back to schedule a colonoscopy in a month with Dr. Hernandez and Dr. Chase.

## 2022-06-03 NOTE — TELEPHONE ENCOUNTER
----- Message from Stefan Chase MD sent at 6/2/2022  5:20 PM EDT -----  Can you schedule him for a repeat colonoscopy with David in one month.  Please place on my schedule as well.

## 2022-06-06 LAB
LAB AP CASE REPORT: NORMAL
LAB AP CLINICAL INFORMATION: NORMAL
PATH REPORT.FINAL DX SPEC: NORMAL
PATH REPORT.GROSS SPEC: NORMAL

## 2022-06-08 ENCOUNTER — PRE-PROCEDURE SCREENING (OUTPATIENT)
Dept: GASTROENTEROLOGY | Facility: CLINIC | Age: 57
End: 2022-06-08

## 2022-06-30 ENCOUNTER — HOSPITAL ENCOUNTER (OUTPATIENT)
Facility: HOSPITAL | Age: 57
Setting detail: HOSPITAL OUTPATIENT SURGERY
Discharge: HOME | End: 2022-06-30
Attending: SURGERY | Admitting: SURGERY

## 2022-06-30 ENCOUNTER — ANESTHESIA EVENT (OUTPATIENT)
Dept: GASTROENTEROLOGY | Facility: HOSPITAL | Age: 57
End: 2022-06-30

## 2022-06-30 ENCOUNTER — ANESTHESIA (OUTPATIENT)
Dept: GASTROENTEROLOGY | Facility: HOSPITAL | Age: 57
End: 2022-06-30

## 2022-06-30 VITALS
SYSTOLIC BLOOD PRESSURE: 120 MMHG | DIASTOLIC BLOOD PRESSURE: 89 MMHG | BODY MASS INDEX: 25.2 KG/M2 | RESPIRATION RATE: 18 BRPM | WEIGHT: 176 LBS | OXYGEN SATURATION: 96 % | HEIGHT: 70 IN | HEART RATE: 93 BPM

## 2022-06-30 DIAGNOSIS — R19.5 POSITIVE COLORECTAL CANCER SCREENING USING COLOGUARD TEST: ICD-10-CM

## 2022-06-30 LAB — GLUCOSE BLDC GLUCOMTR-MCNC: 106 MG/DL (ref 70–130)

## 2022-06-30 PROCEDURE — 25010000002 PROPOFOL 10 MG/ML EMULSION: Performed by: ANESTHESIOLOGY

## 2022-06-30 PROCEDURE — S0260 H&P FOR SURGERY: HCPCS | Performed by: SURGERY

## 2022-06-30 PROCEDURE — 25010000002 PHENYLEPHRINE 10 MG/ML SOLUTION: Performed by: ANESTHESIOLOGY

## 2022-06-30 PROCEDURE — 45385 COLONOSCOPY W/LESION REMOVAL: CPT | Performed by: SURGERY

## 2022-06-30 PROCEDURE — 82962 GLUCOSE BLOOD TEST: CPT

## 2022-06-30 PROCEDURE — 88305 TISSUE EXAM BY PATHOLOGIST: CPT | Performed by: SURGERY

## 2022-06-30 PROCEDURE — 25010000002 GLUCAGON (RDNA) PER 1 MG: Performed by: SURGERY

## 2022-06-30 RX ORDER — SODIUM CHLORIDE, SODIUM LACTATE, POTASSIUM CHLORIDE, CALCIUM CHLORIDE 600; 310; 30; 20 MG/100ML; MG/100ML; MG/100ML; MG/100ML
1000 INJECTION, SOLUTION INTRAVENOUS CONTINUOUS
Status: DISCONTINUED | OUTPATIENT
Start: 2022-06-30 | End: 2022-06-30 | Stop reason: HOSPADM

## 2022-06-30 RX ORDER — PHENYLEPHRINE HYDROCHLORIDE 10 MG/ML
INJECTION INTRAVENOUS AS NEEDED
Status: DISCONTINUED | OUTPATIENT
Start: 2022-06-30 | End: 2022-06-30 | Stop reason: SURG

## 2022-06-30 RX ORDER — SODIUM CHLORIDE, SODIUM LACTATE, POTASSIUM CHLORIDE, CALCIUM CHLORIDE 600; 310; 30; 20 MG/100ML; MG/100ML; MG/100ML; MG/100ML
INJECTION, SOLUTION INTRAVENOUS CONTINUOUS PRN
Status: DISCONTINUED | OUTPATIENT
Start: 2022-06-30 | End: 2022-06-30 | Stop reason: SURG

## 2022-06-30 RX ORDER — SODIUM CHLORIDE 0.9 % (FLUSH) 0.9 %
10 SYRINGE (ML) INJECTION AS NEEDED
Status: DISCONTINUED | OUTPATIENT
Start: 2022-06-30 | End: 2022-06-30 | Stop reason: HOSPADM

## 2022-06-30 RX ORDER — LIDOCAINE HYDROCHLORIDE 10 MG/ML
0.5 INJECTION, SOLUTION INFILTRATION; PERINEURAL ONCE AS NEEDED
Status: DISCONTINUED | OUTPATIENT
Start: 2022-06-30 | End: 2022-06-30 | Stop reason: HOSPADM

## 2022-06-30 RX ORDER — PROPOFOL 10 MG/ML
VIAL (ML) INTRAVENOUS AS NEEDED
Status: DISCONTINUED | OUTPATIENT
Start: 2022-06-30 | End: 2022-06-30 | Stop reason: SURG

## 2022-06-30 RX ORDER — PROPOFOL 10 MG/ML
VIAL (ML) INTRAVENOUS CONTINUOUS PRN
Status: DISCONTINUED | OUTPATIENT
Start: 2022-06-30 | End: 2022-06-30 | Stop reason: SURG

## 2022-06-30 RX ADMIN — SODIUM CHLORIDE, POTASSIUM CHLORIDE, SODIUM LACTATE AND CALCIUM CHLORIDE 1000 ML: 600; 310; 30; 20 INJECTION, SOLUTION INTRAVENOUS at 13:57

## 2022-06-30 RX ADMIN — Medication 160 MCG/KG/MIN: at 15:27

## 2022-06-30 RX ADMIN — PHENYLEPHRINE HYDROCHLORIDE 100 MCG: 10 INJECTION INTRAVENOUS at 15:51

## 2022-06-30 RX ADMIN — SODIUM CHLORIDE, POTASSIUM CHLORIDE, SODIUM LACTATE AND CALCIUM CHLORIDE: 600; 310; 30; 20 INJECTION, SOLUTION INTRAVENOUS at 15:27

## 2022-06-30 RX ADMIN — PHENYLEPHRINE HYDROCHLORIDE 100 MCG: 10 INJECTION INTRAVENOUS at 15:42

## 2022-06-30 RX ADMIN — PROPOFOL 120 MG: 10 INJECTION, EMULSION INTRAVENOUS at 15:26

## 2022-06-30 RX ADMIN — PROPOFOL 100 MG: 10 INJECTION, EMULSION INTRAVENOUS at 15:38

## 2022-06-30 NOTE — ANESTHESIA PREPROCEDURE EVALUATION
Anesthesia Evaluation     Patient summary reviewed and Nursing notes reviewed   NPO Solid Status: > 6 hours  NPO Liquid Status: > 2 hours           Airway   Mallampati: I  TM distance: >3 FB  Neck ROM: full  Dental - normal exam     Pulmonary     breath sounds clear to auscultation  (-) COPD, asthma, sleep apnea  Cardiovascular     Rhythm: regular  Rate: normal    (+) hypertension, hyperlipidemia,   (-) past MI, dysrhythmias, angina, CHF      Neuro/Psych  (+) psychiatric history Anxiety and Depression,    (-) seizures, CVA  GI/Hepatic/Renal/Endo    (+)   diabetes mellitus type 2,   (-) GERD, hepatitis, liver disease, no renal disease, no thyroid disorder    Musculoskeletal     Abdominal    Substance History      OB/GYN          Other                        Anesthesia Plan    ASA 2     MAC       Anesthetic plan, risks, benefits, and alternatives have been provided, discussed and informed consent has been obtained with: patient.        CODE STATUS:

## 2022-06-30 NOTE — ANESTHESIA POSTPROCEDURE EVALUATION
Patient: Zenon Kahn    Procedure Summary     Date: 06/30/22 Room / Location:  [facility]    Anesthesia Start: 1522 Anesthesia Stop: 1601    Procedure: COLONOSCOPY TO CECUM WITH HOT SNARE POLYPECTOMIES (N/A ) Diagnosis:       Positive colorectal cancer screening using Cologuard test      (Positive colorectal cancer screening using Cologuard test [R19.5])    Surgeons: Gene Hernandez MD Provider: Matthew Sanchez MD    Anesthesia Type: MAC ASA Status: 2          Anesthesia Type: MAC    Vitals  Vitals Value Taken Time   /89 06/30/22 1620   Temp     Pulse 93 06/30/22 1620   Resp 18 06/30/22 1620   SpO2 96 % 06/30/22 1620           Post Anesthesia Care and Evaluation    Level of consciousness: awake  Pain management: satisfactory to patient    Airway patency: patent  Anesthetic complications: No anesthetic complications  PONV Status: controlled  Cardiovascular status: acceptable  Respiratory status: acceptable  Hydration status: acceptable

## 2022-07-05 DIAGNOSIS — N52.9 ERECTILE DYSFUNCTION, UNSPECIFIED ERECTILE DYSFUNCTION TYPE: ICD-10-CM

## 2022-07-05 LAB
LAB AP CASE REPORT: NORMAL
PATH REPORT.FINAL DX SPEC: NORMAL
PATH REPORT.GROSS SPEC: NORMAL

## 2022-07-06 ENCOUNTER — DOCUMENTATION (OUTPATIENT)
Dept: SURGERY | Facility: CLINIC | Age: 57
End: 2022-07-06

## 2022-07-06 ENCOUNTER — TELEPHONE (OUTPATIENT)
Dept: SURGERY | Facility: CLINIC | Age: 57
End: 2022-07-06

## 2022-07-06 RX ORDER — TADALAFIL 10 MG/1
10 TABLET ORAL DAILY PRN
Qty: 30 TABLET | Refills: 1 | Status: SHIPPED | OUTPATIENT
Start: 2022-07-06 | End: 2022-09-12

## 2022-07-06 NOTE — PROGRESS NOTES
ENDOSCOPY FOLLOW UP NOTE    Colonoscopy 6/30/2022    Indication:  · Multiple polyps on recent colonoscopy with 2 larger polyps at the distal sigmoid colon that could not be removed at initial endoscopy 6/2/2022    Findings/Pathology:  · 4 small transverse colon polyps/tubular adenomas  · 2 small descending colon polyps/tubular adenoma and hyperplastic  · 2 larger pedunculated distal sigmoid polyps/tubular adenomas    Recommendations:  • Six month surveillance colonoscopy    Gene Hernandez M.D./

## 2022-07-06 NOTE — TELEPHONE ENCOUNTER
----- Message from Gene Hernandez MD sent at 7/6/2022  8:29 AM EDT -----  Please let him know that he had 8 benign polyps, and I would recommend six month surveillance colonoscopy with Dr. Chase-put in computer for reminder

## 2022-07-06 NOTE — PROGRESS NOTES
Please let him know that he had 8 benign polyps, and I would recommend six month surveillance colonoscopy with Dr. Chase-put in computer for reminder

## 2022-08-15 ENCOUNTER — TELEPHONE (OUTPATIENT)
Dept: INTERNAL MEDICINE | Facility: CLINIC | Age: 57
End: 2022-08-15

## 2022-08-15 DIAGNOSIS — I10 ESSENTIAL HYPERTENSION: ICD-10-CM

## 2022-08-15 RX ORDER — SITAGLIPTIN AND METFORMIN HYDROCHLORIDE 1000; 50 MG/1; MG/1
2 TABLET, FILM COATED, EXTENDED RELEASE ORAL DAILY
Qty: 6 TABLET | Refills: 0 | Status: SHIPPED | OUTPATIENT
Start: 2022-08-15 | End: 2022-08-17

## 2022-08-15 RX ORDER — AMLODIPINE AND VALSARTAN 5; 320 MG/1; MG/1
1 TABLET ORAL DAILY
Qty: 3 TABLET | Refills: 0 | Status: SHIPPED | OUTPATIENT
Start: 2022-08-15 | End: 2022-11-21

## 2022-08-15 NOTE — TELEPHONE ENCOUNTER
Caller: Zenon Kahn    Relationship: Self    Best call back number:[phone]     Requested Prescriptions:   Requested Prescriptions     Pending Prescriptions Disp Refills   • amLODIPine-valsartan (EXFORGE) 5-320 MG per tablet 90 tablet 1     Sig: Take 1 tablet by mouth Daily.   • SITagliptin-metFORMIN HCl ER (Janumet XR)  MG tablet 30 tablet      Sig: Take 1 tablet by mouth Daily.        Pharmacy where request should be sent: [id number] - [address] [address] - [phone] [facility] [phone]      Additional details provided by patient:ESE LEFT HIS MEDICATION WHEN HE WENT ON VACATION AND IS WONDERING IF AN EMERGENCY SUPPLY CAN BE SENT TO THE PHARMACY IN [place] FOR THREE DAYS BECAUSE HE WILL BE BACK ON THURSDAY    Does the patient have less than a 3 day supply:  [x] Yes  [] No    Александр KEYS Rep   08/15/22 10:05 EDT

## 2022-08-17 DIAGNOSIS — E11.65 TYPE 2 DIABETES MELLITUS WITH HYPERGLYCEMIA, WITHOUT LONG-TERM CURRENT USE OF INSULIN: Primary | ICD-10-CM

## 2022-08-17 RX ORDER — LINAGLIPTIN AND METFORMIN HYDROCHLORIDE 2.5; 1 MG/1; MG/1
2 TABLET, FILM COATED, EXTENDED RELEASE ORAL DAILY
Qty: 60 TABLET | Refills: 3 | Status: SHIPPED | OUTPATIENT
Start: 2022-08-17

## 2022-08-24 ENCOUNTER — OFFICE VISIT (OUTPATIENT)
Dept: INTERNAL MEDICINE | Facility: CLINIC | Age: 57
End: 2022-08-24

## 2022-08-24 VITALS
OXYGEN SATURATION: 96 % | BODY MASS INDEX: 26.63 KG/M2 | TEMPERATURE: 97.6 F | DIASTOLIC BLOOD PRESSURE: 68 MMHG | WEIGHT: 186 LBS | SYSTOLIC BLOOD PRESSURE: 118 MMHG | HEIGHT: 70 IN | HEART RATE: 105 BPM

## 2022-08-24 DIAGNOSIS — Z23 NEED FOR PNEUMOCOCCAL VACCINATION: ICD-10-CM

## 2022-08-24 DIAGNOSIS — F32.A ANXIETY AND DEPRESSION: Primary | ICD-10-CM

## 2022-08-24 DIAGNOSIS — F41.9 ANXIETY AND DEPRESSION: Primary | ICD-10-CM

## 2022-08-24 DIAGNOSIS — R20.0 NUMBNESS: ICD-10-CM

## 2022-08-24 DIAGNOSIS — E11.65 TYPE 2 DIABETES MELLITUS WITH HYPERGLYCEMIA, WITHOUT LONG-TERM CURRENT USE OF INSULIN: ICD-10-CM

## 2022-08-24 PROCEDURE — 90471 IMMUNIZATION ADMIN: CPT | Performed by: STUDENT IN AN ORGANIZED HEALTH CARE EDUCATION/TRAINING PROGRAM

## 2022-08-24 PROCEDURE — 90677 PCV20 VACCINE IM: CPT | Performed by: STUDENT IN AN ORGANIZED HEALTH CARE EDUCATION/TRAINING PROGRAM

## 2022-08-24 PROCEDURE — 99214 OFFICE O/P EST MOD 30 MIN: CPT | Performed by: STUDENT IN AN ORGANIZED HEALTH CARE EDUCATION/TRAINING PROGRAM

## 2022-08-24 NOTE — PROGRESS NOTES
Bakari Quevedo D.O.  Internal Medicine  [facility]  [address], [address]  [place] [address]  [phone]      Chief Complaint  Diabetes    SUBJECTIVE    History of Present Illness    Zenon Kahn is a 56 y.o. male who presents to the office today as an established patient that last saw me on 5/20/2022.     Type 2 Diabetes:  Takes empagliflozin 25 mg daily and Janumet XR was changed to Jentadueoto XR 2.5-1000 2 pills daily last week due to insurance coverage. Was unable to handle glipizide due to nausea so he stopped that. Sugar in the morning when he checks is around 160.       Anxiety and Depression: remembers being on wellbutrin, trintellix, buspirone, zoloft. He started amitriptyline that I prescribed at last visit but states it didn't help. He is now seeing a psychiatric nurse practitioner with [facility] who started mirtazapine but he felt worse with it and stopped. He has a follow up next week with them.    States he had COVID 19 in November 2020 and he noticed 8 days into his illness that he had an area of numbness on the front of the left lower leg. States he can pinch the skin in that area and not even feel it. It has not worsened or gotten better since that time and the area of numbness is unchanged. He is not concerned about it but just wanted to let me know.       No Known Allergies     Outpatient Medications Marked as Taking for the 8/24/22 encounter (Office Visit) with Bakari Quevedo, DO   Medication Sig Dispense Refill   • amLODIPine-valsartan (EXFORGE) 5-320 MG per tablet Take 1 tablet by mouth Daily. 3 tablet 0   • empagliflozin (JARDIANCE) 25 MG tablet tablet Take 1 tablet by mouth Daily. 30 tablet 2   • glucose blood test strip ACCU CHECK GUIDE strips. Use to check blood glucose up to 2 times daily. 100 each 11   • linaGLIPtin-metFORMIN HCl ER (Jentadueto XR) 2.5-1000 MG tablet sustained-release 24 hour Take 2 tablets by mouth Daily. 60 tablet 3   •  "rosuvastatin (CRESTOR) 10 MG tablet Take 10 mg by mouth Daily.     • tadalafil (Cialis) 10 MG tablet Take 1 tablet by mouth Daily As Needed for Erectile Dysfunction. Take at least 30 minutes prior to anticipated sexual activity as one single dose and not more than once daily. 30 tablet 1        Past Medical History:   Diagnosis Date   • Anxiety and depression    • Colon polyps    • Enchondroma of left femur    • Erectile dysfunction    • Gout    • History of COVID-19 11/2020   • History of transfusion 4/15/66    When neuroblastoma surgery was preformed   • Hx of inguinal hernia repair    • Hyperlipidemia    • Hypertension    • Neuroblastoma (HCC)     with removal in childhood   • Positive colorectal cancer screening using Cologuard test    • Type 2 diabetes mellitus, without long-term current use of insulin (HCC)        OBJECTIVE    Vital Signs:   /68   Pulse 105   Temp 97.6 °F (36.4 °C) (Infrared)   Ht 177.8 cm (70\")   Wt 84.4 kg (186 lb)   SpO2 96%   BMI 26.69 kg/m²     Physical Exam  Vitals reviewed.   Constitutional:       General: He is not in acute distress.     Appearance: Normal appearance. He is not ill-appearing.   Eyes:      General: No scleral icterus.  Pulmonary:      Effort: Pulmonary effort is normal. No respiratory distress.   Musculoskeletal:        Legs:    Neurological:      Mental Status: He is alert.   Psychiatric:         Mood and Affect: Mood normal.         Behavior: Behavior normal.         Thought Content: Thought content normal.                             ASSESSMENT & PLAN     Diagnoses and all orders for this visit:    1. Anxiety and depression (Primary)     -remembers being on wellbutrin, trintellix, buspirone, zoloft. He started amitriptyline that I prescribed at last visit but states it didn't help. He is now seeing a psychiatric nurse practitioner with [facility] who started mirtazapine but he felt worse with it and stopped. He has a follow up next week " with them. I will remove amitriptyline from his medication list and leave the management of this problem to the psychiatric NP.    2. Type 2 diabetes mellitus with hyperglycemia, without long-term current use of insulin ([facility])  -A1c trends on file for this patient:   A1C Last 3 Results    HGBA1C Last 3 Results 4/15/22   Hemoglobin A1C 8.3 (A)   (A) Abnormal value       Comments are available for some flowsheets but are not being displayed.           -Goal A1c for this patient is less than 6.5%  -Current diabetes regimen: Takes empagliflozin 25 mg daily and Janumet XR was changed to Jentadueoto XR 2.5-1000 2 pills daily last week due to insurance coverage.  -Summary of patient's most recent blood glucose trends at home: fasting around 160  -Changes made to diabetes regimen today: check A1c today, consider adding additional therapy if remains uncontrolled  -refer for diabetic eye exam today    3. Numbness  -States he had COVID 19 in November 2020 and he noticed 8 days into his illness that he had an area of numbness on the front of the left lower leg. States he can pinch the skin in that area and not even feel it. The area of numbness has stayed consistent in size since it started.  -on exam there is a linear area of absent sensation approximately 4 inches in length as drawn on the image above; sensation is intact in the other areas of the left lower extremity   -advised pt that I can refer him to neurology for further eval and that while it coincided with his COVID 19 illness it doesn't necessarily mean it is COVID 19 related ... at thist iem he declines further eval but will let me know if so        The following social determinates of health impact the patient's medical decision making: No social determinates of health were factored in to today's visit.     Follow Up  Return in about 3 months (around 11/24/2022) for Recheck.    Patient/family had no further questions at this time and verbalized understanding of the  plan discussed today.

## 2022-08-25 LAB — HBA1C MFR BLD: 7.5 % (ref 4.8–5.6)

## 2022-08-25 RX ORDER — PIOGLITAZONEHYDROCHLORIDE 15 MG/1
15 TABLET ORAL DAILY
Qty: 90 TABLET | Refills: 0 | Status: SHIPPED | OUTPATIENT
Start: 2022-08-25 | End: 2022-12-08

## 2022-09-11 DIAGNOSIS — N52.9 ERECTILE DYSFUNCTION, UNSPECIFIED ERECTILE DYSFUNCTION TYPE: ICD-10-CM

## 2022-09-12 RX ORDER — TADALAFIL 10 MG/1
TABLET ORAL
Qty: 30 TABLET | Refills: 1 | Status: SHIPPED | OUTPATIENT
Start: 2022-09-12 | End: 2022-11-21

## 2022-11-19 DIAGNOSIS — I10 ESSENTIAL HYPERTENSION: ICD-10-CM

## 2022-11-19 DIAGNOSIS — N52.9 ERECTILE DYSFUNCTION, UNSPECIFIED ERECTILE DYSFUNCTION TYPE: ICD-10-CM

## 2022-11-21 RX ORDER — AMLODIPINE AND VALSARTAN 5; 320 MG/1; MG/1
TABLET ORAL
Qty: 90 TABLET | Refills: 0 | Status: SHIPPED | OUTPATIENT
Start: 2022-11-21 | End: 2023-01-23 | Stop reason: SDUPTHER

## 2022-11-21 RX ORDER — TADALAFIL 10 MG/1
TABLET ORAL
Qty: 30 TABLET | Refills: 1 | Status: SHIPPED | OUTPATIENT
Start: 2022-11-21 | End: 2023-03-22 | Stop reason: SDUPTHER

## 2022-12-08 ENCOUNTER — OFFICE VISIT (OUTPATIENT)
Dept: INTERNAL MEDICINE | Facility: CLINIC | Age: 57
End: 2022-12-08

## 2022-12-08 VITALS
HEART RATE: 76 BPM | DIASTOLIC BLOOD PRESSURE: 80 MMHG | WEIGHT: 184 LBS | HEIGHT: 70 IN | SYSTOLIC BLOOD PRESSURE: 110 MMHG | BODY MASS INDEX: 26.34 KG/M2 | OXYGEN SATURATION: 97 % | TEMPERATURE: 97 F

## 2022-12-08 DIAGNOSIS — R61 NIGHT SWEATS: ICD-10-CM

## 2022-12-08 DIAGNOSIS — I10 ESSENTIAL HYPERTENSION: ICD-10-CM

## 2022-12-08 DIAGNOSIS — Z23 NEED FOR IMMUNIZATION AGAINST INFLUENZA: ICD-10-CM

## 2022-12-08 DIAGNOSIS — R97.20 ELEVATED PSA: ICD-10-CM

## 2022-12-08 DIAGNOSIS — E11.65 TYPE 2 DIABETES MELLITUS WITH HYPERGLYCEMIA, WITHOUT LONG-TERM CURRENT USE OF INSULIN: Primary | ICD-10-CM

## 2022-12-08 PROCEDURE — 71046 X-RAY EXAM CHEST 2 VIEWS: CPT | Performed by: STUDENT IN AN ORGANIZED HEALTH CARE EDUCATION/TRAINING PROGRAM

## 2022-12-08 PROCEDURE — 99214 OFFICE O/P EST MOD 30 MIN: CPT | Performed by: STUDENT IN AN ORGANIZED HEALTH CARE EDUCATION/TRAINING PROGRAM

## 2022-12-08 PROCEDURE — 90471 IMMUNIZATION ADMIN: CPT | Performed by: STUDENT IN AN ORGANIZED HEALTH CARE EDUCATION/TRAINING PROGRAM

## 2022-12-08 PROCEDURE — 90686 IIV4 VACC NO PRSV 0.5 ML IM: CPT | Performed by: STUDENT IN AN ORGANIZED HEALTH CARE EDUCATION/TRAINING PROGRAM

## 2022-12-08 NOTE — PROGRESS NOTES
"  Bakari Quevedo D.O.  Internal Medicine  [facility]  [address], [address]  [place] [address]  [phone]      Chief Complaint  Diabetes    SUBJECTIVE    History of Present Illness    Zenon Kahn is a 57 y.o. male who presents to the office today as an established patient that last saw me on 8/24/2022.     Type 2 diabetes:  Prescribed empagliflozin 25 mg daily and Jentadueto XR 2.5-1000 2 pills daily as well as pioglitazone 15 mg daily that was added at last visit. States he is actually only taking the Jentadueto. He has stopped the other medications for 2.5 months.   Reports A1c was 6.4 in November with labs done for insurance policy. Checks blood sugar at home and it runs around 100 in the morning before eating. He states he stopped taking the other medications because he was starting to feel bad in terms of having nausea, fatigue. States he has a list of diabetic eye doctors but has not made appointment yet.     HTN: takes amlodipine - valsartan 5-320 mg daily. Systolic BP averages in the high 120s and diastolic averages around 80.     States he has been waking up with night sweats \"profusely\". States when he wakes up he can see his own outline in sweat from the top of the head to the legs. No fever. It has been occurring nearly nightly. This has been going on for around 1 year but has increased in frequency over the last 3 months. No cough, no coughing up blood.     His PSA last month on insurance screening was 4.98. He urinates frequently and strains to urinate most of the time. There is a discomfort deep in the genitals when he urinates sometimes.     No Known Allergies     Outpatient Medications Marked as Taking for the 12/8/22 encounter (Office Visit) with Bakari Quevedo, DO   Medication Sig Dispense Refill   • amLODIPine-valsartan (EXFORGE) 5-320 MG per tablet TAKE ONE TABLET BY MOUTH DAILY 90 tablet 0   • glucose blood test strip ACCU CHECK GUIDE strips. Use to check blood " "glucose up to 2 times daily. 100 each 11   • linaGLIPtin-metFORMIN HCl ER (Jentadueto XR) 2.5-1000 MG tablet sustained-release 24 hour Take 2 tablets by mouth Daily. 60 tablet 3   • rosuvastatin (CRESTOR) 10 MG tablet Take 10 mg by mouth Daily.     • tadalafil (CIALIS) 10 MG tablet TAKE ONE TABLET BY MOUTH DAILY AS NEEDED FOR ERECTILE DYSFUNCTION ** TAKE AT LEAST 30 MINUTES PRIOR TO ANTICIPATED SEXUAL ACTIVITY AS ONE SINGLE DOSE AND NOT MORE THAN ONCE DAILY ** 30 tablet 1        Past Medical History:   Diagnosis Date   • Anxiety and depression    • Colon polyps    • Enchondroma of left femur    • Erectile dysfunction    • Gout    • History of COVID-19 11/2020   • History of transfusion 4/15/66    When neuroblastoma surgery was preformed   • Hx of inguinal hernia repair    • Hyperlipidemia    • Hypertension    • Neuroblastoma (HCC)     with removal in childhood   • Positive colorectal cancer screening using Cologuard test    • Type 2 diabetes mellitus, without long-term current use of insulin (HCC)        OBJECTIVE    Vital Signs:   /80   Pulse 76   Temp 97 °F (36.1 °C) (Infrared)   Ht 177.8 cm (70\")   Wt 83.5 kg (184 lb)   SpO2 97%   BMI 26.40 kg/m²     Physical Exam  Vitals reviewed.   Constitutional:       General: He is not in acute distress.     Appearance: Normal appearance. He is not ill-appearing.   HENT:      Head: Atraumatic.   Eyes:      General: No scleral icterus.  Pulmonary:      Effort: Pulmonary effort is normal. No respiratory distress.   Genitourinary:     Prostate: Enlarged. Not tender and no nodules present.      Rectum: Normal.   Skin:     Coloration: Skin is not jaundiced.   Neurological:      Mental Status: He is alert.   Psychiatric:         Mood and Affect: Mood normal.         Behavior: Behavior normal.         Thought Content: Thought content normal.                             ASSESSMENT & PLAN     Diagnoses and all orders for this visit:    1. Type 2 diabetes mellitus with " "hyperglycemia, without long-term current use of insulin ([facility]) (Primary)  -A1c trends on file for this patient:   A1C Last 3 Results    HGBA1C Last 3 Results 4/15/22 8/24/22   Hemoglobin A1C 8.3 (A) 7.5 (A)   (A) Abnormal value       Comments are available for some flowsheets but are not being displayed.           -Goal A1c for this patient is less than 6.5%  -Current diabetes regimen: Prescribed empagliflozin 25 mg daily and Jentadueto XR 2.5-1000 2 pills daily as well as pioglitazone 15 mg daily that was added at last visit. States he is actually only taking the Jentadueto. He has stopped the other medications for 2.5 months.   Reports A1c was 6.4 in November with labs done for insurance policy. Checks blood sugar at home and it runs around 100 in the morning before eating. He states he stopped taking the other medications because he was starting to feel bad in terms of having nausea, fatigue. States he has a list of diabetic eye doctors but has not made appointment yet.   -Changes made to diabetes regimen today: continue Jentadueto only as it seems he had some hypoglycemia symptoms with his previous regimen   -recheck A1c today to ensure his insurance result was accurate  -encouraged him to get diabetic eye exam     2. Essential hypertension  -takes amlodipine - valsartan 5-320 mg daily. Systolic BP averages in the high 120s and diastolic averages around 80.  -BP well controlled in office today  -check renal function and electrolytes today, continue current regimen    3. Night sweats  -States he has been waking up with night sweats \"profusely\". States when he wakes up he can see his own outline in sweat from the top of the head to the legs. No fever. It has been occurring nearly nightly. This has been going on for around 1 year but has increased in frequency over the last 3 months. No cough, no coughing up blood.   -on exam he is in no acute distress , weight stable, afebrile  -will obtain initial eval for night " sweats as below   -also has report of elevated PSA at work (see #4 below) which could be contributing if he does have an underlying prostate malignancy   -     CBC w AUTO Differential  -     XR Chest PA & Lateral (In Office)  -     QuantiFERON TB Gold  -     HIV-1 / O / 2 Ag / Antibody 4th Generation  -     C-reactive protein  -     Comprehensive metabolic panel  -     TSH Rfx On Abnormal To Free T4  -     Urinalysis With Culture If Indicated -    4. Elevated PSA  -Pt states his PSA last month on insurance screening was 4.98. He urinates frequently and strains to urinate most of the time. There is a discomfort deep in the genitals when he urinates sometimes.   Lab Results   Component Value Date    PSA 3.0 05/20/2022    PSA 2.41 01/31/2019    PSA 1.74 05/13/2014     -PSAs in our office are up to date and last normal in May 2022  -will repeat PSA today to verify his insurance result  -he has some prostate symptoms as well, will refer him to urology for further eval  -on exam, he has a nontender prostate that is enlarged diffusely  -     PSA DIAGNOSTIC    5. Need for immunization against influenza  -     FluLaval/Fluarix/Fluzone >6 Months            The following social determinates of health impact the patient's medical decision making: No social determinates of health were factored in to today's visit.     Follow Up  Return in about 3 months (around 3/8/2023) for Recheck.    Patient/family had no further questions at this time and verbalized understanding of the plan discussed today.

## 2022-12-10 LAB
ALBUMIN SERPL-MCNC: 4.4 G/DL (ref 3.8–4.9)
ALBUMIN/GLOB SERPL: 2.3 {RATIO} (ref 1.2–2.2)
ALP SERPL-CCNC: 73 IU/L (ref 44–121)
ALT SERPL-CCNC: 19 IU/L (ref 0–44)
APPEARANCE UR: CLEAR
AST SERPL-CCNC: 17 IU/L (ref 0–40)
BACTERIA #/AREA URNS HPF: NORMAL /[HPF]
BASOPHILS # BLD AUTO: 0 X10E3/UL (ref 0–0.2)
BASOPHILS NFR BLD AUTO: 1 %
BILIRUB SERPL-MCNC: 0.5 MG/DL (ref 0–1.2)
BILIRUB UR QL STRIP: NEGATIVE
BUN SERPL-MCNC: 16 MG/DL (ref 6–24)
BUN/CREAT SERPL: 18 (ref 9–20)
CALCIUM SERPL-MCNC: 9.6 MG/DL (ref 8.7–10.2)
CASTS URNS QL MICRO: NORMAL /LPF
CHLORIDE SERPL-SCNC: 99 MMOL/L (ref 96–106)
CO2 SERPL-SCNC: 24 MMOL/L (ref 20–29)
COLOR UR: YELLOW
CREAT SERPL-MCNC: 0.91 MG/DL (ref 0.76–1.27)
CRP SERPL-MCNC: <1 MG/L (ref 0–10)
EGFRCR SERPLBLD CKD-EPI 2021: 98 ML/MIN/1.73
EOSINOPHIL # BLD AUTO: 0.3 X10E3/UL (ref 0–0.4)
EOSINOPHIL NFR BLD AUTO: 4 %
EPI CELLS #/AREA URNS HPF: NORMAL /HPF (ref 0–10)
ERYTHROCYTE [DISTWIDTH] IN BLOOD BY AUTOMATED COUNT: 12.8 % (ref 11.6–15.4)
GAMMA INTERFERON BACKGROUND BLD IA-ACNC: 0.05 IU/ML
GLOBULIN SER CALC-MCNC: 1.9 G/DL (ref 1.5–4.5)
GLUCOSE SERPL-MCNC: 137 MG/DL (ref 70–99)
GLUCOSE UR QL STRIP: NEGATIVE
HBA1C MFR BLD: 6.8 % (ref 4.8–5.6)
HCT VFR BLD AUTO: 42.4 % (ref 37.5–51)
HGB BLD-MCNC: 14.6 G/DL (ref 13–17.7)
HGB UR QL STRIP: NEGATIVE
HIV 1+2 AB+HIV1 P24 AG SERPL QL IA: NON REACTIVE
IMM GRANULOCYTES # BLD AUTO: 0 X10E3/UL (ref 0–0.1)
IMM GRANULOCYTES NFR BLD AUTO: 0 %
KETONES UR QL STRIP: NEGATIVE
LEUKOCYTE ESTERASE UR QL STRIP: NEGATIVE
LYMPHOCYTES # BLD AUTO: 1.6 X10E3/UL (ref 0.7–3.1)
LYMPHOCYTES NFR BLD AUTO: 23 %
M TB IFN-G BLD-IMP: NEGATIVE
M TB IFN-G CD4+ BCKGRND COR BLD-ACNC: 0.07 IU/ML
M TB IFN-G CD4+CD8+ BCKGRND COR BLD-ACNC: 0.06 IU/ML
MCH RBC QN AUTO: 31.7 PG (ref 26.6–33)
MCHC RBC AUTO-ENTMCNC: 34.4 G/DL (ref 31.5–35.7)
MCV RBC AUTO: 92 FL (ref 79–97)
MICRO URNS: NORMAL
MICRO URNS: NORMAL
MITOGEN IGNF BLD-ACNC: >10 IU/ML
MONOCYTES # BLD AUTO: 0.9 X10E3/UL (ref 0.1–0.9)
MONOCYTES NFR BLD AUTO: 12 %
NEUTROPHILS # BLD AUTO: 4.2 X10E3/UL (ref 1.4–7)
NEUTROPHILS NFR BLD AUTO: 60 %
NITRITE UR QL STRIP: NEGATIVE
PH UR STRIP: 5.5 [PH] (ref 5–7.5)
PLATELET # BLD AUTO: 253 X10E3/UL (ref 150–450)
POTASSIUM SERPL-SCNC: 4.8 MMOL/L (ref 3.5–5.2)
PROT SERPL-MCNC: 6.3 G/DL (ref 6–8.5)
PROT UR QL STRIP: NEGATIVE
PSA SERPL-MCNC: 4.6 NG/ML (ref 0–4)
QUANTIFERON INCUBATION: NORMAL
RBC # BLD AUTO: 4.6 X10E6/UL (ref 4.14–5.8)
RBC #/AREA URNS HPF: NORMAL /HPF (ref 0–2)
SERVICE CMNT-IMP: NORMAL
SODIUM SERPL-SCNC: 139 MMOL/L (ref 134–144)
SP GR UR STRIP: 1.02 (ref 1–1.03)
TSH SERPL DL<=0.005 MIU/L-ACNC: 0.52 UIU/ML (ref 0.45–4.5)
URINALYSIS REFLEX: NORMAL
UROBILINOGEN UR STRIP-MCNC: 0.2 MG/DL (ref 0.2–1)
WBC # BLD AUTO: 7.1 X10E3/UL (ref 3.4–10.8)
WBC #/AREA URNS HPF: NORMAL /HPF (ref 0–5)

## 2023-01-23 DIAGNOSIS — I10 ESSENTIAL HYPERTENSION: ICD-10-CM

## 2023-01-23 RX ORDER — AMLODIPINE AND VALSARTAN 5; 320 MG/1; MG/1
1 TABLET ORAL DAILY
Qty: 90 TABLET | Refills: 0 | Status: SHIPPED | OUTPATIENT
Start: 2023-01-23

## 2023-03-22 ENCOUNTER — TELEPHONE (OUTPATIENT)
Dept: INTERNAL MEDICINE | Facility: CLINIC | Age: 58
End: 2023-03-22
Payer: COMMERCIAL

## 2023-03-22 DIAGNOSIS — N52.9 ERECTILE DYSFUNCTION, UNSPECIFIED ERECTILE DYSFUNCTION TYPE: ICD-10-CM

## 2023-03-22 RX ORDER — TADALAFIL 10 MG/1
TABLET ORAL
Qty: 30 TABLET | Refills: 1 | Status: SHIPPED | OUTPATIENT
Start: 2023-03-22

## 2023-08-02 DIAGNOSIS — E11.65 TYPE 2 DIABETES MELLITUS WITH HYPERGLYCEMIA, WITHOUT LONG-TERM CURRENT USE OF INSULIN: ICD-10-CM

## 2023-08-02 RX ORDER — LINAGLIPTIN AND METFORMIN HYDROCHLORIDE 2.5; 1 MG/1; MG/1
2 TABLET, FILM COATED, EXTENDED RELEASE ORAL DAILY
Qty: 180 TABLET | Refills: 0 | Status: SHIPPED | OUTPATIENT
Start: 2023-08-02

## 2023-10-12 DIAGNOSIS — I10 ESSENTIAL HYPERTENSION: ICD-10-CM

## 2023-10-12 RX ORDER — AMLODIPINE AND VALSARTAN 5; 320 MG/1; MG/1
1 TABLET ORAL DAILY
Qty: 90 TABLET | Refills: 1 | Status: SHIPPED | OUTPATIENT
Start: 2023-10-12

## 2023-10-29 DIAGNOSIS — E11.65 TYPE 2 DIABETES MELLITUS WITH HYPERGLYCEMIA, WITHOUT LONG-TERM CURRENT USE OF INSULIN: ICD-10-CM

## 2023-10-30 RX ORDER — LINAGLIPTIN AND METFORMIN HYDROCHLORIDE 2.5; 1 MG/1; MG/1
2 TABLET, FILM COATED, EXTENDED RELEASE ORAL DAILY
Qty: 180 TABLET | Refills: 0 | Status: SHIPPED | OUTPATIENT
Start: 2023-10-30

## 2023-12-27 DIAGNOSIS — I10 ESSENTIAL HYPERTENSION: ICD-10-CM

## 2023-12-27 RX ORDER — AMLODIPINE AND VALSARTAN 5; 320 MG/1; MG/1
1 TABLET ORAL DAILY
Qty: 90 TABLET | Refills: 1 | OUTPATIENT
Start: 2023-12-27

## 2023-12-27 NOTE — TELEPHONE ENCOUNTER
Caller: FemiZenon    Relationship: Self    Best call back number: [phone]    Requested Prescriptions:   Requested Prescriptions     Pending Prescriptions Disp Refills    amLODIPine-valsartan (EXFORGE) 5-320 MG per tablet 90 tablet 1     Sig: Take 1 tablet by mouth Daily.        Pharmacy where request should be sent: [facility]Y #[id number] - [place], [place] - [address] AT [address] - [phone]  - [phone] FX     Last office visit with prescribing clinician: 12/8/2022   Last telemedicine visit with prescribing clinician: Visit date not found   Next office visit with prescribing clinician: Visit date not found     Additional details provided by patient: PATIENT STATED HE IS OUT OF THIS MEDICATION, AND LEAVES TO GO OUT OF TOWN TOMORROW.    Does the patient have less than a 3 day supply:  [x] Yes  [] No    Would you like a call back once the refill request has been completed: [x] Yes [] No    If the office needs to give you a call back, can they leave a voicemail: [x] Yes [] No    Александр Mendez Rep   12/27/23 15:04 EST

## 2024-01-29 DIAGNOSIS — I10 ESSENTIAL HYPERTENSION: ICD-10-CM

## 2024-01-29 RX ORDER — AMLODIPINE AND VALSARTAN 5; 320 MG/1; MG/1
1 TABLET ORAL DAILY
Qty: 20 TABLET | Refills: 0 | Status: SHIPPED | OUTPATIENT
Start: 2024-01-29

## 2024-01-29 NOTE — TELEPHONE ENCOUNTER
Caller: Zenon Kahn    Relationship: Self    Best call back number: [phone]     Requested Prescriptions:   Requested Prescriptions     Pending Prescriptions Disp Refills    amLODIPine-valsartan (EXFORGE) 5-320 MG per tablet 90 tablet 1     Sig: Take 1 tablet by mouth Daily.        Pharmacy where request should be sent: [facility] [id number] [place], [place] - [address] AT [address] - [phone]  - [phone]      Last office visit with prescribing clinician: 12/8/2022   Last telemedicine visit with prescribing clinician: Visit date not found   Next office visit with prescribing clinician: 2/5/2024     Additional details provided by patient: PATIENT HAS A ONE DAY SUPPLY LEFT. PATIENT IS ASKING IF HE CAN GET A REFILL TO LAST UNTIL HE IS ABLE TO COME IN FOR HIS APPOINTMENT ON 02/05/2024.    Does the patient have less than a 3 day supply:  [x] Yes  [] No    Would you like a call back once the refill request has been completed: [] Yes [x] No    If the office needs to give you a call back, can they leave a voicemail: [] Yes [x] No    Александр Rogers Rep   01/29/24 15:10 EST

## 2024-02-07 ENCOUNTER — OFFICE VISIT (OUTPATIENT)
Dept: INTERNAL MEDICINE | Facility: CLINIC | Age: 59
End: 2024-02-07
Payer: COMMERCIAL

## 2024-02-07 VITALS
DIASTOLIC BLOOD PRESSURE: 70 MMHG | BODY MASS INDEX: 26.63 KG/M2 | HEIGHT: 70 IN | WEIGHT: 186 LBS | OXYGEN SATURATION: 98 % | HEART RATE: 108 BPM | SYSTOLIC BLOOD PRESSURE: 100 MMHG

## 2024-02-07 DIAGNOSIS — E11.65 TYPE 2 DIABETES MELLITUS WITH HYPERGLYCEMIA, WITHOUT LONG-TERM CURRENT USE OF INSULIN: ICD-10-CM

## 2024-02-07 DIAGNOSIS — Z11.4 ENCOUNTER FOR SCREENING FOR HIV: ICD-10-CM

## 2024-02-07 DIAGNOSIS — M1A.09X0 CHRONIC GOUT OF MULTIPLE SITES, UNSPECIFIED CAUSE: ICD-10-CM

## 2024-02-07 DIAGNOSIS — Z00.00 ANNUAL PHYSICAL EXAM: Primary | ICD-10-CM

## 2024-02-07 DIAGNOSIS — R00.0 TACHYCARDIA: ICD-10-CM

## 2024-02-07 DIAGNOSIS — D12.6 ADENOMATOUS POLYP OF COLON, UNSPECIFIED PART OF COLON: ICD-10-CM

## 2024-02-07 DIAGNOSIS — E78.2 MIXED HYPERLIPIDEMIA: ICD-10-CM

## 2024-02-07 RX ORDER — DULOXETIN HYDROCHLORIDE 60 MG/1
1 CAPSULE, DELAYED RELEASE ORAL DAILY
COMMUNITY
Start: 2024-01-02

## 2024-02-07 RX ORDER — TAMSULOSIN HYDROCHLORIDE 0.4 MG/1
1 CAPSULE ORAL DAILY
COMMUNITY
Start: 2024-01-19

## 2024-02-07 RX ORDER — SILDENAFIL 100 MG/1
100 TABLET, FILM COATED ORAL AS NEEDED
COMMUNITY
Start: 2024-01-30

## 2024-02-07 RX ORDER — ROSUVASTATIN CALCIUM 10 MG/1
10 TABLET, COATED ORAL DAILY
Qty: 90 TABLET | Refills: 1 | Status: SHIPPED | OUTPATIENT
Start: 2024-02-07

## 2024-02-07 RX ORDER — CLOTRIMAZOLE AND BETAMETHASONE DIPROPIONATE 10; .64 MG/G; MG/G
1 CREAM TOPICAL SEE ADMIN INSTRUCTIONS
COMMUNITY
Start: 2023-11-08

## 2024-02-07 NOTE — PROGRESS NOTES
Bakari Quevedo D.O.  Internal Medicine  [facility]  [address], [address]  [place] [address]  [phone]    Chief Complaint  Annual checkup    HISTORY    Zenon Kahn is a 58 y.o. male who presents to the office today as a  an established patient for their annual preventative exam.      No hospitalization(s) within the last year.     Current exercise regimen: walking      Status of chronic medical conditions:    HLD: previously prescribed rosuvastatin 10 mg daily for primary prevention but states he stopped he stopped this around 6 months ago.      Erectile dysfunction: takes sildenafil 100 mg daily as needed from [facility]    Anxiety and Depression: remembers being on wellbutrin, trintellix, buspirone, zoloft. Also trialed amitriptyline without improvement. Currently on duloxetine 60 mg daily by Suzie DAS.    HTN: takes amlodipine - valsartan 5-320 mg daily. Checks BP a few times per week and it is 120/80 for example usually.     Type 2 diabetes:  Takes Jentadueto XR 2.5-1000 2 pills daily. States brand name medications are very expensive for him.   Fasting blood sugar is around 130. He tries to stay away from sweets and tries to eat more salads for lunch. Not much pasta.     Prostate cancer/ED/BPH: [facility] , s/p REZUM procedure for BPH. On active surveillance for BPH. Takes tamsulosin 0.4 mg nightly.     Colon polyps: states he was due for repeat colonoscopy but never got that done due to other health     Gout : no flare in over 20 years.     Any other concerns regarding their health today:     Health Maintenance Summary            Overdue - Hepatitis B (1 of 3 - 3-dose series) Never done      No completion history exists for this topic.              Overdue - DIABETIC FOOT EXAM (Yearly) Never done      No completion history exists for this topic.              Overdue - DIABETIC EYE EXAM (Yearly) Never done      No completion history exists for  this topic.              Ordered - LIPID PANEL (Yearly) Ordered on 2/7/2024      04/15/2022  Lipid Panel With LDL/HDL Ratio    07/31/2020  LIPID PANEL    01/31/2019  LIPID PANEL    10/27/2014  Lipid panel    05/13/2014  Lipid panel    Only the first 5 history entries have been loaded, but more history exists.              Ordered - URINE MICROALBUMIN (Yearly) Ordered on 2/7/2024      04/15/2022  Multiple components of Microalbumin / Creatinine Urine Ratio - Urine, Clean Catch    10/27/2014  Multiple components of Microalbumin / creatinine urine ratio    05/13/2014  Multiple components of Microalbumin / creatinine urine ratio              Overdue - BMI FOLLOWUP (Yearly) Overdue since 5/25/2023 05/25/2022  Smart[employer]: BMI EDUCATION FOR OVERWEIGHT              Ordered - HEMOGLOBIN A1C (Every 6 Months) Ordered on 2/7/2024 12/08/2022  Hemoglobin A1C component of Hemoglobin A1c    08/24/2022  Hemoglobin A1C component of Hemoglobin A1c    04/15/2022  Hemoglobin A1C component of Hemoglobin A1c    07/30/2020  Hemoglobin A1C component of HEMOGLOBIN A1C    01/31/2019  Hemoglobin A1C component of HEMOGLOBIN A1C    Only the first 5 history entries have been loaded, but more history exists.              Overdue - INFLUENZA VACCINE (Yearly - August to March) Overdue since 8/1/2023 12/08/2022  Imm Admin: FluLaval/Fluzone >6mos    02/28/2022  Imm Admin: Flublok 18+yrs    11/28/2021  Imm Admin: Flublok 18+yrs    12/17/2020  Imm Admin: Influenza, Unspecified    12/17/2020  Imm Admin: Flublok 18+yrs    Only the first 5 history entries have been loaded, but more history exists.              Overdue - COVID-19 Vaccine (4 - 2023-24 season) Overdue since 9/1/2023      10/07/2021  Imm Admin: COVID-19 (PFIZER) PURPLE CAP    04/06/2021  Imm Admin: COVID-19 (PFIZER) PURPLE CAP    03/16/2021  Imm Admin: COVID-19 (PFIZER) PURPLE CAP              ANNUAL PHYSICAL (Yearly) Next due on 2/7/2025 02/07/2024  Done    05/20/2022  Done               TDAP/TD VACCINES (2 - Td or Tdap) Next due on 2/7/2029 02/07/2019  Imm Admin: Tdap              COLORECTAL CANCER SCREENING (COLONOSCOPY - Every 10 Years) Next due on 6/30/2032 06/30/2022  Surgical Procedure: COLONOSCOPY    06/30/2022  COLONOSCOPY    06/02/2022  COLONOSCOPY    06/02/2022  Surgical Procedure: COLONOSCOPY    05/10/2022  SCANNED - COLOGUARD    Only the first 5 history entries have been loaded, but more history exists.              HEPATITIS C SCREENING  Completed      01/31/2019  HEPATITIS C ANTIBODY              Pneumococcal Vaccine 0-64 (Series Information) Completed      08/24/2022  Imm Admin: Pneumococcal Conjugate 20-Valent (PCV20)              Discontinued - ZOSTER VACCINE  Discontinued      12/17/2020  Imm Admin: Zoster, Unspecified    08/20/2020  Imm Admin: Zoster, Unspecified                     No Known Allergies     Outpatient Medications Marked as Taking for the 2/7/24 encounter (Office Visit) with Bakari Quevedo DO   Medication Sig Dispense Refill    amLODIPine-valsartan (EXFORGE) 5-320 MG per tablet Take 1 tablet by mouth Daily. 20 tablet 0    clotrimazole-betamethasone (LOTRISONE) 1-0.05 % cream Apply 1 Application topically to the appropriate area as directed See Admin Instructions. Apply to the affected area every 12 hours      DULoxetine (CYMBALTA) 60 MG capsule Take 1 capsule by mouth Daily.      glucose blood test strip ACCU CHECK GUIDE strips. Use to check blood glucose up to 2 times daily. 100 each 11    rosuvastatin (CRESTOR) 10 MG tablet Take 1 tablet by mouth Daily. 90 tablet 1    sildenafil (VIAGRA) 100 MG tablet Take 1 tablet by mouth As Needed for Erectile Dysfunction.      tamsulosin (FLOMAX) 0.4 MG capsule 24 hr capsule Take 1 capsule by mouth Daily.      [DISCONTINUED] Jentadueto XR 2.5-1000 MG tablet sustained-release 24 hour TAKE 2 TABLETS BY MOUTH EVERY  tablet 0    [DISCONTINUED] tadalafil (CIALIS) 10 MG tablet TAKE ONE TABLET BY MOUTH  DAILY AS NEEDED FOR ERECTILE DYSFUNCTION. TAKE AT LEAST 30 MINUTES PRIOR TO ANTICIPATED SEXUAL ACTIVITY AS ONE SINGLE DOSE AND NOT MORE THAN ONCE DAILY 30 tablet 1       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    Positive colorectal cancer screening using Cologuard test     Prostate cancer 2023    Type 2 diabetes mellitus, without long-term current use of insulin      Past Surgical History:   Procedure Laterality Date    COLONOSCOPY N/A 06/02/2022    Procedure: COLONOSCOPY to cecum with hot snare and cold forceps polypectomies, saline lift, and tattoo at 85 cm and 30 cm;  Surgeon: Stefan Chase MD;  Location: [facility] ENDOSCOPY;  Service: General;  Laterality: N/A;  pre- positive cologuard  post- polyps    COLONOSCOPY N/A 06/30/2022    Procedure: COLONOSCOPY TO CECUM WITH HOT SNARE POLYPECTOMIES;  Surgeon: Gene Hernandez MD;  Location: [facility] ENDOSCOPY;  Service: General;  Laterality: N/A;  Pre: Postive cologaurd test  Post: polyps    CYSTOSCOPY  2023    No stones, no foreign bodies, no tumors. Urethra normal. BPH present. Median lobe not present.    INGUINAL HERNIA REPAIR Left     NEUROBLASTOMA EXCISION      AS AN INFANT    PROSTATE SURGERY      REZUM procedure     Family History   Problem Relation Age of Onset    Cancer Mother         Carnoid tumor of the liver    Heart attack Father     Peripheral vascular disease Father     Hypertension Father     Hyperlipidemia Father     Alcohol abuse Father     Early death Father         57 years old    Diabetes Maternal Grandmother     Malig Hyperthermia Neg Hx     reports that he has never smoked. He has  "never used smokeless tobacco. He reports that he does not currently use alcohol after a past usage of about 10.0 standard drinks of alcohol per week. He reports that he does not use drugs.    Immunization History   Administered Date(s) Administered    COVID-19 (PFIZER) Purple Cap Monovalent 03/16/2021, 04/06/2021, 10/07/2021    Flu Vaccine Split Quad 01/31/2019    Flublok 18+yrs 12/17/2020, 11/28/2021, 02/28/2022    Fluzone (or Fluarix & Flulaval for VFC) >6mos 01/31/2019, 12/08/2022    Hepatitis A 08/20/2020    Influenza, Unspecified 12/17/2020    Pneumococcal Conjugate 20-Valent (PCV20) 08/24/2022    Tdap 02/07/2019    Zoster, Unspecified 08/20/2020, 12/17/2020        OBJECTIVE    Vital Signs:   /70   Pulse 108   Ht 177.8 cm (70\")   Wt 84.4 kg (186 lb)   SpO2 98%   BMI 26.69 kg/m²     Physical Exam  Vitals reviewed.   Constitutional:       General: He is not in acute distress.     Appearance: Normal appearance. He is not ill-appearing.   HENT:      Head: Normocephalic and atraumatic.      Right Ear: Tympanic membrane, ear canal and external ear normal. There is no impacted cerumen.      Left Ear: Tympanic membrane, ear canal and external ear normal. There is no impacted cerumen.      Mouth/Throat:      Mouth: Mucous membranes are moist.      Pharynx: No oropharyngeal exudate or posterior oropharyngeal erythema.   Eyes:      General: No scleral icterus.     Extraocular Movements: Extraocular movements intact.      Conjunctiva/sclera: Conjunctivae normal.      Pupils: Pupils are equal, round, and reactive to light.   Cardiovascular:      Rate and Rhythm: Regular rhythm. Tachycardia present.      Heart sounds: Normal heart sounds. No murmur heard.  Pulmonary:      Effort: Pulmonary effort is normal. No respiratory distress.      Breath sounds: Normal breath sounds. No wheezing.   Abdominal:      General: Bowel sounds are normal. There is no distension.      Palpations: Abdomen is soft.      Tenderness: " There is no abdominal tenderness. There is no guarding.   Musculoskeletal:      Cervical back: Neck supple.      Right lower leg: No edema.      Left lower leg: No edema.   Lymphadenopathy:      Cervical: No cervical adenopathy.   Skin:     General: Skin is warm and dry.      Coloration: Skin is not jaundiced.   Neurological:      General: No focal deficit present.      Mental Status: He is alert and oriented to person, place, and time.      Cranial Nerves: No cranial nerve deficit.      Motor: No weakness.   Psychiatric:         Mood and Affect: Mood normal.         Behavior: Behavior normal.         Thought Content: Thought content normal.                   The 10-year ASCVD risk score (Heriberto MANUEL, et al., 2019) is: 7.2%    Values used to calculate the score:      Age: 58 years      Sex: Male      Is Non- : No      Diabetic: Yes      Tobacco smoker: No      Systolic Blood Pressure: 100 mmHg      Is BP treated: Yes      HDL Cholesterol: 61 mg/dL      Total Cholesterol: 161 mg/dL       ECG 12 Lead    Date/Time: 2/7/2024 1:50 PM  Performed by: Bakari Quevedo DO    Authorized by: Bakari Quevedo DO  Comparison: compared with previous ECG from 5/20/2022  Similar to previous ECG  Comparison to previous ECG: Now with sinus tachycardia, otherwise similar   Rhythm: sinus tachycardia  Rate: tachycardic  Rate comments: 102  QRS axis: left  Other findings: early repolarization    Clinical impression: abnormal EKG           ASSESSMENT & PLAN     Annual Preventative Health Examination  -Age and sex appropriate physical exam performed and documented. Updated past medical, family, social and surgical histories as well as allergies and care team list. Addressed care gaps listed in the medical record.  -Encouraged annual dental and vision exams as part of their overall health.  -Encouraged minimum of 30 minutes or more of exercise at a brisk walk or higher 5 days per week combined with a well-balanced diet.    -Immunizations reviewed and updated in EMR. COVID19 recommended.  -Lipid screening:  Patient is over age 40 and a 10-year ASCVD risk was calculated which does indicate a need for statin therapy. See plan below.    -Aspirin for primary or secondary prevention: ASCVD risk calculate and aspirin for primary prevention is not indicated.  -Depression and Anxiety screening: Patient has known diagnosis of depression and / or anxiety.  -Diabetes screening: Patient already has a diagnosis of diabetes mellitus and is being treated.   -Tobacco use screening: Conducted and addressed if indicated.   -Alcohol use screening: Conducted and addressed if indicated.   -Illicit drug screening: Conducted and addressed if indicated.   -Abdominal aortic aneurysm screening: AAA screening is not indicated as patient is less than 65 years of age.  -Hypertension screening: Patient with known diagnosis of hypertension and is receiving treatment.  -HIV screening: Will obtain one time HIV screen today.   -Hepatitis C virus screening:  Patient has already completed Hepatitis C screening. Negative screening on file.   -Syphilis screening: Syphilis screening not indicated.  -Hepatitis B virus screening: Screening not indicated, not in a high-risk group.  -Colon cancer screening: see plan below   -Lung cancer screening: Patient has never smoked.  -Prostate cancer screening: Patient has known prostate cancer, screening not indicated.      Follow up in 1 year for annual physical exam.    Patient/family had no further questions at this time and verbalized understanding of the plan discussed today.     A problem-based visit was also conducted on the same day, see below for assessment and plan    Diagnoses and all orders for this visit:    1. Mixed hyperlipidemia (Primary)  2. Type 2 diabetes mellitus with hyperglycemia, without long-term current use of insulin  Lab Results   Component Value Date    HGBA1C 6.8 (H) 12/08/2022     -lost to follow up for  over 1 year with no A1c  -Goal A1c for this patient is less than 6.5%  -Current diabetes regimen:Takes Jentadueto XR 2.5-1000 2 pills daily. States brand name medications are very expensive for him.   Fasting blood sugar is around 130. He tries to stay away from sweets and tries to eat more salads for lunch. Not much pasta.   -Changes made to diabetes regimen today: recheck A1c today. Stop Jentadueto due to cost. Start metformin generic 1000 mg twice daily instead. Add in glipizide or pioglitazone if A1c is high and indicates need for multiple treatments.   -Diabetic kidney disease screening: due for annual screening, will place order today  -For patients aged 40-75, discussed a target LDL of less than 70.previously prescribed rosuvastatin 10 mg daily for primary prevention but states he stopped he stopped this around 6 months ago. Recommended restart of statin therapy given diabetes and HTN diagnosis. Advised him this is recipe for heart attack and stroke. Recheck lipid today to see what he is at without medication.  -Discussed with patient the importance of yearly eye exams to check for retinopathy in patients with diabetes. Patient has not had an exam in the last year.      3. Adenomatous polyp of colon, unspecified part of colon  -he is past due for 6 month colonoscopy follow up. Recommended he contact gen surgery asap to arrange    4. Chronic gout of multiple sites, unspecified cause  -no flare in over 20 years  -recheck periodic uric acid today. Consider treatment with ULT if greater than 9    5. Sinus tachycardia  -improved as patient was in office  -EKG no change from 5/2022   -continue to monitor         The following social determinates of health impact the patient's medical decision making: No social determinates of health were factored in to today's visit.     Follow Up  Return in about 3 months (around 5/7/2024) for Recheck.

## 2024-02-12 LAB
ALBUMIN SERPL-MCNC: 4.6 G/DL (ref 3.8–4.9)
ALBUMIN/CREAT UR: 98 MG/G CREAT (ref 0–29)
ALBUMIN/GLOB SERPL: 2 {RATIO} (ref 1.2–2.2)
ALP SERPL-CCNC: 119 IU/L (ref 44–121)
ALT SERPL-CCNC: 15 IU/L (ref 0–44)
AST SERPL-CCNC: 17 IU/L (ref 0–40)
BASOPHILS # BLD AUTO: 0 X10E3/UL (ref 0–0.2)
BASOPHILS NFR BLD AUTO: 0 %
BILIRUB SERPL-MCNC: 0.4 MG/DL (ref 0–1.2)
BUN SERPL-MCNC: 8 MG/DL (ref 6–24)
BUN/CREAT SERPL: 9 (ref 9–20)
CALCIUM SERPL-MCNC: 10 MG/DL (ref 8.7–10.2)
CHLORIDE SERPL-SCNC: 92 MMOL/L (ref 96–106)
CHOLEST SERPL-MCNC: 222 MG/DL (ref 100–199)
CO2 SERPL-SCNC: 23 MMOL/L (ref 20–29)
CREAT SERPL-MCNC: 0.86 MG/DL (ref 0.76–1.27)
CREAT UR-MCNC: 25.3 MG/DL
EGFRCR SERPLBLD CKD-EPI 2021: 100 ML/MIN/1.73
EOSINOPHIL # BLD AUTO: 0.2 X10E3/UL (ref 0–0.4)
EOSINOPHIL NFR BLD AUTO: 2 %
ERYTHROCYTE [DISTWIDTH] IN BLOOD BY AUTOMATED COUNT: 12.7 % (ref 11.6–15.4)
GLOBULIN SER CALC-MCNC: 2.3 G/DL (ref 1.5–4.5)
GLUCOSE SERPL-MCNC: 192 MG/DL (ref 70–99)
HBA1C MFR BLD: 9.4 % (ref 4.8–5.6)
HCT VFR BLD AUTO: 44.3 % (ref 37.5–51)
HDLC SERPL-MCNC: 76 MG/DL
HGB BLD-MCNC: 15.1 G/DL (ref 13–17.7)
HIV 1+2 AB+HIV1 P24 AG SERPL QL IA: NON REACTIVE
IMM GRANULOCYTES # BLD AUTO: 0 X10E3/UL (ref 0–0.1)
IMM GRANULOCYTES NFR BLD AUTO: 0 %
LDLC SERPL CALC-MCNC: 132 MG/DL (ref 0–99)
LYMPHOCYTES # BLD AUTO: 1.8 X10E3/UL (ref 0.7–3.1)
LYMPHOCYTES NFR BLD AUTO: 17 %
MCH RBC QN AUTO: 31.2 PG (ref 26.6–33)
MCHC RBC AUTO-ENTMCNC: 34.1 G/DL (ref 31.5–35.7)
MCV RBC AUTO: 92 FL (ref 79–97)
MICROALBUMIN UR-MCNC: 24.9 UG/ML
MONOCYTES # BLD AUTO: 0.9 X10E3/UL (ref 0.1–0.9)
MONOCYTES NFR BLD AUTO: 9 %
NEUTROPHILS # BLD AUTO: 7.4 X10E3/UL (ref 1.4–7)
NEUTROPHILS NFR BLD AUTO: 72 %
PLATELET # BLD AUTO: 235 X10E3/UL (ref 150–450)
POTASSIUM SERPL-SCNC: 4.8 MMOL/L (ref 3.5–5.2)
PROT SERPL-MCNC: 6.9 G/DL (ref 6–8.5)
RBC # BLD AUTO: 4.84 X10E6/UL (ref 4.14–5.8)
SODIUM SERPL-SCNC: 131 MMOL/L (ref 134–144)
TRIGL SERPL-MCNC: 83 MG/DL (ref 0–149)
URATE SERPL-MCNC: 5.1 MG/DL (ref 3.8–8.4)
VLDLC SERPL CALC-MCNC: 14 MG/DL (ref 5–40)
WBC # BLD AUTO: 10.3 X10E3/UL (ref 3.4–10.8)

## 2024-02-14 DIAGNOSIS — E11.65 TYPE 2 DIABETES MELLITUS WITH HYPERGLYCEMIA, WITHOUT LONG-TERM CURRENT USE OF INSULIN: Primary | ICD-10-CM

## 2024-02-21 ENCOUNTER — TELEMEDICINE (OUTPATIENT)
Dept: INTERNAL MEDICINE | Facility: CLINIC | Age: 59
End: 2024-02-21
Payer: COMMERCIAL

## 2024-02-21 DIAGNOSIS — G47.00 INSOMNIA, UNSPECIFIED TYPE: Primary | ICD-10-CM

## 2024-02-21 PROCEDURE — 99214 OFFICE O/P EST MOD 30 MIN: CPT | Performed by: STUDENT IN AN ORGANIZED HEALTH CARE EDUCATION/TRAINING PROGRAM

## 2024-02-21 RX ORDER — LEMBOREXANT 5 MG/1
5 TABLET, FILM COATED ORAL NIGHTLY
Qty: 30 TABLET | Refills: 3 | Status: SHIPPED | OUTPATIENT
Start: 2024-02-21 | End: 2024-02-22 | Stop reason: SDUPTHER

## 2024-02-21 NOTE — PROGRESS NOTES
"  Bakari Quevedo D.O.  Internal Medicine  [facility]  [address], [address]  [place] [address]  [phone]      Chief Complaint  Insomnia    SUBJECTIVE    Insomnia        Zenon Kahn is a 58 y.o. male who presents to the office today as an established patient that last saw me on 2/7/2024.   Mode of Visit: Video  Location of patient: home in [place]   Location of provider: [facility]  You have chosen to receive care through a telehealth visit.  Does the patient consent to use a video/audio connection their medical care today? yes  The visit included audio and video interaction. No technical issues occurred during this visit.     Insomnia: states he has dealt with this for a few years. States his trouble is with both falling and staying asleep. It takes him an hour to an hour and a half to fall asleep pretty much every night. States he was on trazodone in the past \"made me tired toward the end of the day\". Racing mind is an issue for him. Pt states symptoms are severely impacting his life.     No Known Allergies     Outpatient Medications Marked as Taking for the 2/21/24 encounter (Telemedicine) with Bakari Quevedo, DO   Medication Sig Dispense Refill    amLODIPine-valsartan (EXFORGE) 5-320 MG per tablet Take 1 tablet by mouth Daily. 20 tablet 0    clotrimazole-betamethasone (LOTRISONE) 1-0.05 % cream Apply 1 Application topically to the appropriate area as directed See Admin Instructions. Apply to the affected area every 12 hours      DULoxetine (CYMBALTA) 60 MG capsule Take 1 capsule by mouth Daily.      empagliflozin (Jardiance) 10 MG tablet tablet Take 1 tablet by mouth Daily. 30 tablet 3    glucose blood test strip ACCU CHECK GUIDE strips. Use to check blood glucose up to 2 times daily. 100 each 11    metFORMIN (GLUCOPHAGE) 1000 MG tablet Take 1 tablet by mouth 2 (Two) Times a Day With Meals. 180 tablet 1    pioglitazone (Actos) 30 MG tablet Take 1 tablet by mouth Daily. 90 " tablet 0    rosuvastatin (CRESTOR) 10 MG tablet Take 1 tablet by mouth Daily. 90 tablet 1    sildenafil (VIAGRA) 100 MG tablet Take 1 tablet by mouth As Needed for Erectile Dysfunction.      tamsulosin (FLOMAX) 0.4 MG capsule 24 hr capsule Take 1 capsule by mouth Daily.          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    Positive colorectal cancer screening using Cologuard test     Prostate cancer 2023    Type 2 diabetes mellitus, without long-term current use of insulin      Past Surgical History:   Procedure Laterality Date    COLONOSCOPY N/A 06/02/2022    Procedure: COLONOSCOPY to cecum with hot snare and cold forceps polypectomies, saline lift, and tattoo at 85 cm and 30 cm;  Surgeon: Stefan Chase MD;  Location: [facility] ENDOSCOPY;  Service: General;  Laterality: N/A;  pre- positive cologuard  post- polyps    COLONOSCOPY N/A 06/30/2022    Procedure: COLONOSCOPY TO CECUM WITH HOT SNARE POLYPECTOMIES;  Surgeon: Gene Hernandez MD;  Location: [facility] ENDOSCOPY;  Service: General;  Laterality: N/A;  Pre: Postive cologaurd test  Post: polyps    CYSTOSCOPY  2023    No stones, no foreign bodies, no tumors. Urethra normal. BPH present. Median lobe not present.    INGUINAL HERNIA REPAIR Left     NEUROBLASTOMA EXCISION      AS AN INFANT    PROSTATE SURGERY      REZUM procedure     Family History   Problem Relation Age of Onset    Cancer Mother         Carnoid tumor of the liver    Heart attack Father     Peripheral vascular disease Father     Hypertension Father     Hyperlipidemia Father     Alcohol abuse Father     Early death Father         57 years  "old    Diabetes Maternal Grandmother     Reggie Eduardo Neg Hx     reports that he has never smoked. He has never used smokeless tobacco. He reports that he does not currently use alcohol after a past usage of about 10.0 standard drinks of alcohol per week. He reports that he does not use drugs.    OBJECTIVE    Vital Signs:   There were no vitals taken for this visit.       Physical Exam  Vitals reviewed: limited phsycial exam 2/2 telehealth.   Constitutional:       General: He is not in acute distress.     Appearance: Normal appearance. He is not ill-appearing.   Eyes:      General: No scleral icterus.  Pulmonary:      Effort: Pulmonary effort is normal. No respiratory distress.   Skin:     Coloration: Skin is not jaundiced.   Neurological:      Mental Status: He is alert.   Psychiatric:         Mood and Affect: Mood normal.         Behavior: Behavior normal.         Thought Content: Thought content normal.                             ASSESSMENT & PLAN     Diagnoses and all orders for this visit:    1. Insomnia, unspecified type (Primary)  -chronic, uncontrolled  -states he has dealt with this for a few years. States his trouble is with both falling and staying asleep. It takes him an hour to an hour and a half to fall asleep pretty much every night. States he was on trazodone in the past \"made me tired toward the end of the day\". Racing mind is an issue for him. Pt states symptoms are severely impacting his life.   -discussed need and benefit of CBT-I therapy for both sleep hygiene and to see if they can teach him some tools to reduce mind racing. Provided him with contact information  -given duration of symptoms and severity , I am open to medication trial. Introduced DayVigo. Discussed common side effects including fatigue and drowsiness, headache, abdominal dreams and nightmares. He is agreeable to treatment, will start at 5 mg nightly. Advised him that long term no medication is ideal for insomnia and I " would really like him to get into CBT-I to reduce need for long term treatment.   -SABI reviewed and is appropriate. If he ends up liking treatment I will get UDS.   -     Lemborexant (DayVigo) 5 MG tablet; Take 5 mg by mouth Every Night. Take right before bed,with at least 7 hours remaining before planned awakening  Dispense: 30 tablet; Refill: 3            Follow Up  No follow-ups on file.    Patient/family had no further questions at this time and verbalized understanding of the plan discussed today.

## 2024-02-22 DIAGNOSIS — E11.65 TYPE 2 DIABETES MELLITUS WITH HYPERGLYCEMIA, WITHOUT LONG-TERM CURRENT USE OF INSULIN: ICD-10-CM

## 2024-02-22 DIAGNOSIS — G47.00 INSOMNIA, UNSPECIFIED TYPE: ICD-10-CM

## 2024-02-22 RX ORDER — LEMBOREXANT 5 MG/1
5 TABLET, FILM COATED ORAL NIGHTLY
Qty: 30 TABLET | Refills: 3 | Status: SHIPPED | OUTPATIENT
Start: 2024-02-22

## 2024-02-23 ENCOUNTER — TELEPHONE (OUTPATIENT)
Dept: INTERNAL MEDICINE | Facility: CLINIC | Age: 59
End: 2024-02-23
Payer: COMMERCIAL

## 2024-02-23 NOTE — TELEPHONE ENCOUNTER
Caller: Zenon Kahn    Relationship: Self    Best call back number: [phone]     Which medication are you concerned about:     Lemborexant (DayVigo) 5 MG tablet       Who prescribed you this medication: DR CARVER    When did you start taking this medication: 02/22/24    What are your concerns: PATIENT IS CALLING TO STATE THE [facility] TOLD HIM THEY HAVE NOT RECEIVED THE ABOVE PRESCRIPTION.  HE IS ASKING IF DR CARVER WOULD BE WILLING TO RE SEND.      [facility]/pharmacy #[id number] - [place], [place] - [address] AT [address] - [phone]  - [phone]  [phone]     PLEASE ADVISE.

## 2024-03-12 ENCOUNTER — TELEPHONE (OUTPATIENT)
Dept: INTERNAL MEDICINE | Facility: CLINIC | Age: 59
End: 2024-03-12
Payer: COMMERCIAL

## 2024-03-12 NOTE — TELEPHONE ENCOUNTER
Caller: Zenon Kahn    Relationship: Self    Best call back number: [phone]     Which medication are you concerned about: metFORMIN (GLUCOPHAGE) 1000 MG tablet ,empagliflozin (Jardiance) 10 MG tablet tablet ,pioglitazone (Actos) 30 MG tablet     Who prescribed you this medication: DR.KYLE CARVER    When did you start taking this medication: 2-13-24    What are your concerns: PATIENT HAS STARTED HAVING DIZZINESS AND LIGHT HEADEDNESS, CLOUDY AND FOGGY FEELING    How long have you had these concerns: PATIENT SAYS HE NOTICED IT EARLY ON. PATIENT SAYS IT IS GETTING WORSE.

## 2024-03-23 DIAGNOSIS — I10 ESSENTIAL HYPERTENSION: ICD-10-CM

## 2024-03-25 RX ORDER — AMLODIPINE AND VALSARTAN 5; 320 MG/1; MG/1
1 TABLET ORAL DAILY
Qty: 30 TABLET | Refills: 4 | Status: SHIPPED | OUTPATIENT
Start: 2024-03-25

## 2024-05-07 DIAGNOSIS — E11.65 TYPE 2 DIABETES MELLITUS WITH HYPERGLYCEMIA, WITHOUT LONG-TERM CURRENT USE OF INSULIN: ICD-10-CM

## 2024-05-07 RX ORDER — PIOGLITAZONEHYDROCHLORIDE 30 MG/1
30 TABLET ORAL DAILY
Qty: 90 TABLET | Refills: 0 | Status: SHIPPED | OUTPATIENT
Start: 2024-05-07

## 2024-08-03 DIAGNOSIS — E11.65 TYPE 2 DIABETES MELLITUS WITH HYPERGLYCEMIA, WITHOUT LONG-TERM CURRENT USE OF INSULIN: ICD-10-CM

## 2024-08-05 NOTE — TELEPHONE ENCOUNTER
Ok for HUB to read and schedule;    Sent patient a message to schedule an office visit with Dr. Quevedo in order to get medication refilled

## 2024-08-07 ENCOUNTER — OFFICE VISIT (OUTPATIENT)
Dept: INTERNAL MEDICINE | Facility: CLINIC | Age: 59
End: 2024-08-07
Payer: COMMERCIAL

## 2024-08-07 VITALS
DIASTOLIC BLOOD PRESSURE: 78 MMHG | HEIGHT: 70 IN | BODY MASS INDEX: 25.91 KG/M2 | SYSTOLIC BLOOD PRESSURE: 114 MMHG | WEIGHT: 181 LBS | HEART RATE: 129 BPM

## 2024-08-07 DIAGNOSIS — R42 FEELING FAINT: ICD-10-CM

## 2024-08-07 DIAGNOSIS — R94.31 ABNORMAL EKG: ICD-10-CM

## 2024-08-07 DIAGNOSIS — I95.1 ORTHOSTATIC HYPOTENSION: ICD-10-CM

## 2024-08-07 DIAGNOSIS — R42 DIZZINESS: Primary | ICD-10-CM

## 2024-08-07 DIAGNOSIS — E11.9 TYPE 2 DIABETES MELLITUS WITHOUT COMPLICATION, WITHOUT LONG-TERM CURRENT USE OF INSULIN: ICD-10-CM

## 2024-08-07 PROBLEM — M10.9 GOUT: Status: ACTIVE | Noted: 2020-01-08

## 2024-08-07 PROBLEM — C61 PROSTATE CANCER: Status: ACTIVE | Noted: 2024-08-07

## 2024-08-07 PROBLEM — I10 ESSENTIAL HYPERTENSION: Status: ACTIVE | Noted: 2020-01-08

## 2024-08-07 PROBLEM — E78.00 HYPERCHOLESTEROLEMIA: Status: ACTIVE | Noted: 2024-08-07

## 2024-08-07 LAB
ALBUMIN SERPL-MCNC: 4.4 G/DL (ref 3.5–5.2)
ALBUMIN/GLOB SERPL: 2.3 G/DL
ALP SERPL-CCNC: 90 U/L (ref 39–117)
ALT SERPL-CCNC: 17 U/L (ref 1–41)
AST SERPL-CCNC: 25 U/L (ref 1–40)
BASOPHILS # BLD AUTO: 0.03 10*3/MM3 (ref 0–0.2)
BASOPHILS NFR BLD AUTO: 0.4 % (ref 0–1.5)
BILIRUB SERPL-MCNC: 0.6 MG/DL (ref 0–1.2)
BUN SERPL-MCNC: 15 MG/DL (ref 6–20)
BUN/CREAT SERPL: 14.2 (ref 7–25)
CALCIUM SERPL-MCNC: 10.3 MG/DL (ref 8.6–10.5)
CHLORIDE SERPL-SCNC: 92 MMOL/L (ref 98–107)
CO2 SERPL-SCNC: 26.9 MMOL/L (ref 22–29)
CREAT SERPL-MCNC: 1.06 MG/DL (ref 0.76–1.27)
EGFRCR SERPLBLD CKD-EPI 2021: 81.3 ML/MIN/1.73
EOSINOPHIL # BLD AUTO: 0.12 10*3/MM3 (ref 0–0.4)
EOSINOPHIL NFR BLD AUTO: 1.7 % (ref 0.3–6.2)
ERYTHROCYTE [DISTWIDTH] IN BLOOD BY AUTOMATED COUNT: 12.1 % (ref 12.3–15.4)
GLOBULIN SER CALC-MCNC: 1.9 GM/DL
GLUCOSE SERPL-MCNC: 179 MG/DL (ref 65–99)
HBA1C MFR BLD: 7.6 % (ref 4.8–5.6)
HCT VFR BLD AUTO: 40.9 % (ref 37.5–51)
HGB BLD-MCNC: 14 G/DL (ref 13–17.7)
IMM GRANULOCYTES # BLD AUTO: 0.02 10*3/MM3 (ref 0–0.05)
IMM GRANULOCYTES NFR BLD AUTO: 0.3 % (ref 0–0.5)
LYMPHOCYTES # BLD AUTO: 1.57 10*3/MM3 (ref 0.7–3.1)
LYMPHOCYTES NFR BLD AUTO: 21.7 % (ref 19.6–45.3)
MCH RBC QN AUTO: 31.8 PG (ref 26.6–33)
MCHC RBC AUTO-ENTMCNC: 34.2 G/DL (ref 31.5–35.7)
MCV RBC AUTO: 93 FL (ref 79–97)
MONOCYTES # BLD AUTO: 0.89 10*3/MM3 (ref 0.1–0.9)
MONOCYTES NFR BLD AUTO: 12.3 % (ref 5–12)
NEUTROPHILS # BLD AUTO: 4.59 10*3/MM3 (ref 1.7–7)
NEUTROPHILS NFR BLD AUTO: 63.6 % (ref 42.7–76)
NRBC BLD AUTO-RTO: 0 /100 WBC (ref 0–0.2)
PLATELET # BLD AUTO: 240 10*3/MM3 (ref 140–450)
POTASSIUM SERPL-SCNC: 4.6 MMOL/L (ref 3.5–5.2)
PROT SERPL-MCNC: 6.3 G/DL (ref 6–8.5)
RBC # BLD AUTO: 4.4 10*6/MM3 (ref 4.14–5.8)
SODIUM SERPL-SCNC: 132 MMOL/L (ref 136–145)
TSH SERPL DL<=0.005 MIU/L-ACNC: 0.58 UIU/ML (ref 0.27–4.2)
WBC # BLD AUTO: 7.22 10*3/MM3 (ref 3.4–10.8)

## 2024-08-07 PROCEDURE — 99214 OFFICE O/P EST MOD 30 MIN: CPT | Performed by: INTERNAL MEDICINE

## 2024-08-07 PROCEDURE — 93000 ELECTROCARDIOGRAM COMPLETE: CPT | Performed by: INTERNAL MEDICINE

## 2024-08-07 NOTE — PROGRESS NOTES
Subjective     Zenon Kahn is a 58 y.o. male who presents with   Chief Complaint   Patient presents with    Dizziness        D/o dizziness going on a couple weeks.  Occurs with standing mainly.  Not with exertion. No increased SOA.  No chest pain.  Feels like could pass out.  BP running in the 130s/80s.      Review of Systems   Constitutional:  Positive for night sweats.   Respiratory:  Negative for chest tightness and shortness of breath.    Cardiovascular:  Negative for chest pain and leg swelling.   Neurological:  Positive for dizziness and light-headedness. Negative for syncope.       The following portions of the patient's history were reviewed and updated as appropriate: allergies, current medications and problem list.    Patient Active Problem List    Diagnosis Date Noted    Hypercholesterolemia 08/07/2024    Prostate cancer 08/07/2024    Positive colorectal cancer screening using Cologuard test 05/25/2022     Note Last Updated: 5/25/2022     Added automatically from request for surgery [id number]      Essential hypertension 01/08/2020    Gout 01/08/2020    Type 2 diabetes mellitus 01/08/2020       Current Outpatient Medications on File Prior to Visit   Medication Sig Dispense Refill    amLODIPine-valsartan (EXFORGE) 5-320 MG per tablet TAKE 1 TABLET BY MOUTH EVERY DAY 30 tablet 4    clotrimazole-betamethasone (LOTRISONE) 1-0.05 % cream Apply 1 Application topically to the appropriate area as directed See Admin Instructions. Apply to the affected area every 12 hours      DULoxetine (CYMBALTA) 60 MG capsule Take 1 capsule by mouth Daily.      glucose blood test strip ACCU CHECK GUIDE strips. Use to check blood glucose up to 2 times daily. 100 each 11    metFORMIN (GLUCOPHAGE) 1000 MG tablet Take 1 tablet by mouth 2 (Two) Times a Day With Meals. 180 tablet 1    pioglitazone (ACTOS) 30 MG tablet TAKE 1 TABLET BY MOUTH EVERY DAY 90 tablet 0    rosuvastatin (CRESTOR) 10 MG tablet Take 1 tablet by mouth Daily. 90  "tablet 1    sildenafil (VIAGRA) 100 MG tablet Take 1 tablet by mouth As Needed for Erectile Dysfunction.      tamsulosin (FLOMAX) 0.4 MG capsule 24 hr capsule Take 1 capsule by mouth Daily.      [DISCONTINUED] empagliflozin (Jardiance) 10 MG tablet tablet Take 1 tablet by mouth Daily. 30 tablet 3    [DISCONTINUED] Lemborexant (DayVigo) 5 MG tablet Take 5 mg by mouth Every Night. Take right before bed,with at least 7 hours remaining before planned awakening 30 tablet 3     No current facility-administered medications on file prior to visit.       Objective     /70   Pulse 101   Ht 177.8 cm (70\")   Wt 82.1 kg (181 lb)   BMI 25.97 kg/m²     Physical Exam  Constitutional:       Appearance: He is well-developed.   HENT:      Head: Atraumatic.      Right Ear: Hearing and tympanic membrane normal.      Left Ear: Hearing and tympanic membrane normal.      Mouth/Throat:      Pharynx: No oropharyngeal exudate or posterior oropharyngeal erythema.   Cardiovascular:      Rate and Rhythm: Normal rate and regular rhythm.      Heart sounds: Normal heart sounds.   Pulmonary:      Effort: Pulmonary effort is normal.      Breath sounds: Normal breath sounds.   Skin:     General: Skin is warm and dry.   Neurological:      Mental Status: He is alert and oriented to person, place, and time.       ECG 12 Lead    Date/Time: 8/7/2024 9:59 AM  Performed by: Izabela Gary MD    Authorized by: Izabela Gary MD  Comparison: compared with previous ECG from 2/7/2024  Similar to previous ECG  Rhythm: sinus rhythm  Rate: normal  Conduction: right bundle branch block  Q waves: V1, V2, III and aVF    ST Segments: ST segments normal  T Waves: T waves normal  QRS axis: normal    Clinical impression: abnormal EKG            Assessment & Plan   Diagnoses and all orders for this visit:    1. Dizziness (Primary)  -     ECG 12 Lead  -     CBC & Differential  -     Comprehensive Metabolic Panel  -     TSH Rfx On Abnormal To Free T4  -     " Adult Transthoracic Echo Complete W/ Cont if Necessary Per Protocol; Future  -     Holter Monitor - 48 Hour; Future    2. Feeling faint  -     ECG 12 Lead  -     CBC & Differential  -     Comprehensive Metabolic Panel  -     TSH Rfx On Abnormal To Free T4  -     Adult Transthoracic Echo Complete W/ Cont if Necessary Per Protocol; Future  -     Holter Monitor - 48 Hour; Future    3. Abnormal EKG  -     ECG 12 Lead  -     CBC & Differential  -     Comprehensive Metabolic Panel  -     TSH Rfx On Abnormal To Free T4  -     Adult Transthoracic Echo Complete W/ Cont if Necessary Per Protocol; Future  -     Holter Monitor - 48 Hour; Future    4. Type 2 diabetes mellitus without complication, without long-term current use of insulin  -     Hemoglobin A1c        Discussion    Patient presents with new onset dizziness and feeling faint.  He is orthostatic in office today.  Check labs.  Check echo and holter.  Likely Flomax and lack of fluids contributing.  Increase attention to 64 ounces fluid daily.  F/u Dr. Quevedo about one month.         No future appointments.

## 2024-08-23 DIAGNOSIS — I10 ESSENTIAL HYPERTENSION: ICD-10-CM

## 2024-08-23 RX ORDER — AMLODIPINE AND VALSARTAN 5; 320 MG/1; MG/1
1 TABLET ORAL DAILY
Qty: 30 TABLET | Refills: 0 | Status: SHIPPED | OUTPATIENT
Start: 2024-08-23

## 2024-08-26 ENCOUNTER — OFFICE VISIT (OUTPATIENT)
Dept: INTERNAL MEDICINE | Facility: CLINIC | Age: 59
End: 2024-08-26
Payer: COMMERCIAL

## 2024-08-26 VITALS
SYSTOLIC BLOOD PRESSURE: 122 MMHG | OXYGEN SATURATION: 98 % | HEIGHT: 70 IN | WEIGHT: 190 LBS | DIASTOLIC BLOOD PRESSURE: 58 MMHG | BODY MASS INDEX: 27.2 KG/M2 | RESPIRATION RATE: 16 BRPM | HEART RATE: 90 BPM

## 2024-08-26 DIAGNOSIS — S81.811A LACERATION OF RIGHT LOWER EXTREMITY, INITIAL ENCOUNTER: Primary | ICD-10-CM

## 2024-08-26 DIAGNOSIS — S80.11XD TRAUMATIC HEMATOMA OF RIGHT LOWER LEG, SUBSEQUENT ENCOUNTER: ICD-10-CM

## 2024-08-26 PROCEDURE — 99214 OFFICE O/P EST MOD 30 MIN: CPT | Performed by: NURSE PRACTITIONER

## 2024-08-26 NOTE — PROGRESS NOTES
Subjective   Zenon Kahn is a 58 y.o. male. Patient is here today for   Chief Complaint   Patient presents with    Laceration     1 week ago on right leg          Vitals:    08/26/24 1331   BP: 122/58   Pulse: 90   Resp: 16   SpO2: 98%     Body mass index is 27.26 kg/m².  The following portions of the patient's history were reviewed and updated as appropriate: allergies, current medications, past family history, past medical history, past social history, past surgical history and problem list.    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    Positive colorectal cancer screening using Cologuard test     Prostate cancer 2023    Type 2 diabetes mellitus, without long-term current use of insulin       No Known Allergies   Social History     Socioeconomic History    Marital status:      Spouse name: Shasta    Number of children: 1   Tobacco Use    Smoking status: Never    Smokeless tobacco: Never   Vaping Use    Vaping status: Never Used   Substance and Sexual Activity    Alcohol use: Yes     Alcohol/week: 10.0 standard drinks of alcohol     Types: 10 Drinks containing 0.5 oz of alcohol per week    Drug use: Never    Sexual activity: Not Currently     Partners: Male     Birth control/protection: None        Current Outpatient Medications:     amLODIPine-valsartan (EXFORGE) 5-320 MG per tablet, TAKE 1 TABLET BY MOUTH EVERY DAY, Disp: 30 tablet, Rfl: 0    clotrimazole-betamethasone (LOTRISONE) 1-0.05 % cream, Apply 1 Application topically to the appropriate area as directed See Admin Instructions. Apply to the affected area every 12 hours, Disp: ,  Rfl:     DULoxetine (CYMBALTA) 60 MG capsule, Take 1 capsule by mouth Daily., Disp: , Rfl:     glucose blood test strip, ACCU CHECK GUIDE strips. Use to check blood glucose up to 2 times daily., Disp: 100 each, Rfl: 11    metFORMIN (GLUCOPHAGE) 1000 MG tablet, Take 1 tablet by mouth 2 (Two) Times a Day With Meals., Disp: 180 tablet, Rfl: 1    pioglitazone (ACTOS) 30 MG tablet, TAKE 1 TABLET BY MOUTH EVERY DAY, Disp: 90 tablet, Rfl: 0    rosuvastatin (CRESTOR) 10 MG tablet, Take 1 tablet by mouth Daily., Disp: 90 tablet, Rfl: 1    sildenafil (VIAGRA) 100 MG tablet, Take 1 tablet by mouth As Needed for Erectile Dysfunction., Disp: , Rfl:     tamsulosin (FLOMAX) 0.4 MG capsule 24 hr capsule, Take 1 capsule by mouth Daily., Disp: , Rfl:      Objective     History of Present Illness  Mr Kahn is a 58 year old male patient of Dr Quevedo with Type 2 DM who is here for an ER follow up. He was seen at [facility] ER four times last  week. Initially he was seen after falling into a picture box that had glass that cut his leg while helping his son move. He was seen in the ER and staples were placed. He was seen three more times as the  wound not stop bleeding. 5 Sutures were placed to help stop the bleeding. He continued to bleed  after discharge and then returned to the ER again where his staples were removed and 28 more sutures were placed He had to return to the ER once more on the 20/th due to increased bleeding  Labs were done and lower extremity doppler was done on 8/20/24 that showed Right lower extremity with no evidence of hemodynamically significant arterial stenosis or disease.   No previous examination available for comparison.   Incidental finding, hematoma in posterior lateral calf measuring, 8.12cm x 4.5cm.  He continues to have some swelling in his right lower leg and the area is still bleeding. He has had some mild pain and some mild numbness in his right foot     The following data was reviewed by: Kitty FRANCIS  PIPPA Miller on 08/26/2024:  CBC          8/7/2024    10:27 8/19/2024    23:47 8/20/2024    23:24   CBC   WBC 7.22  7.04     10.49       RBC 4.40  3.90     3.08       Hemoglobin 14.0  12.0     9.7       Hematocrit 40.9  35.7     28.8       MCV 93.0  91.5     93.5       MCH 31.8  30.8     31.5       MCHC 34.2  33.6     33.7       RDW 12.1  11.9     12.2       Platelets 240  242     225          Details          This result is from an external source.             INR          8/20/2024    23:24   Common Labs   INR 1.1          Details          This result is from an external source.             Data reviewed : Cardiology studies venous doppler and ER visit notes       Review of Systems   Constitutional:  Negative for fever.   Respiratory: Negative.     Cardiovascular:  Positive for leg swelling.   Skin:  Positive for wound.        There has been no purulent drainage    Neurological:  Positive for numbness.       Physical Exam  Vitals and nursing note reviewed.   Constitutional:       General: He is not in acute distress.  Cardiovascular:      Rate and Rhythm: Normal rate.      Pulses:           Posterior tibial pulses are 2+ on the right side.   Pulmonary:      Effort: Pulmonary effort is normal.   Musculoskeletal:      Right lower leg: Edema present.   Skin:     General: Skin is warm and dry.      Comments: There is a jagged laceration to right lower leg with sutures in place. Dressing was removed and there was sanguinous drainage noted.   Moderate sized blood clot removed that was around sutures   Neurological:      Mental Status: He is alert and oriented to person, place, and time.   Psychiatric:         Mood and Affect: Mood normal.         Assessment    ASSESSMENT    Problems Addressed this Visit    None  Visit Diagnoses       Laceration of right lower extremity, initial encounter    -  Primary    Relevant Orders    Ambulatory Referral to Plastic Surgery    Ambulatory Referral to General Surgery    Traumatic  hematoma of right lower leg, subsequent encounter              Diagnoses         Codes Comments    Laceration of right lower extremity, initial encounter    -  Primary ICD-10-CM: S81.811A  ICD-9-CM: 894.0     Traumatic hematoma of right lower leg, subsequent encounter     ICD-10-CM: S80.11XD  ICD-9-CM: V58.89, 924.10             PLAN    Pt was also examined by Dr Quevedo.   Will attempt to get the patient into plastic surgery urgently for evaluation , and if they cannot see him will contact general surgery.   Area was cleaned and redressed   Keep leg elevated  Tylenol or motrin as needed for pain  Pt advised to follow if if he develops purulent drainage, worsening pain, swelling, fever, or increased bleeding  TDAP was updated in the ER  I spent 30 minutes caring for Zenon on this date of service. This time includes time spent by me in the following activities: preparing for the visit, reviewing tests, obtaining and/or reviewing a separately obtained history, performing a medically appropriate examination and/or evaluation, counseling and educating the patient/family/caregiver, ordering medications, tests, or procedures, referring and communicating with other health care professionals, documenting information in the medical record, and dressing wound with MA .

## 2024-08-28 ENCOUNTER — OFFICE VISIT (OUTPATIENT)
Dept: SURGERY | Facility: CLINIC | Age: 59
End: 2024-08-28
Payer: COMMERCIAL

## 2024-08-28 VITALS
WEIGHT: 188 LBS | SYSTOLIC BLOOD PRESSURE: 118 MMHG | HEIGHT: 70 IN | DIASTOLIC BLOOD PRESSURE: 74 MMHG | BODY MASS INDEX: 26.92 KG/M2

## 2024-08-28 DIAGNOSIS — S81.801A WOUND OF RIGHT LOWER EXTREMITY, INITIAL ENCOUNTER: Primary | ICD-10-CM

## 2024-08-28 PROCEDURE — 99213 OFFICE O/P EST LOW 20 MIN: CPT | Performed by: STUDENT IN AN ORGANIZED HEALTH CARE EDUCATION/TRAINING PROGRAM

## 2024-09-04 ENCOUNTER — OFFICE VISIT (OUTPATIENT)
Dept: SURGERY | Facility: CLINIC | Age: 59
End: 2024-09-04
Payer: COMMERCIAL

## 2024-09-04 VITALS
SYSTOLIC BLOOD PRESSURE: 152 MMHG | BODY MASS INDEX: 27.26 KG/M2 | WEIGHT: 190.4 LBS | DIASTOLIC BLOOD PRESSURE: 86 MMHG | HEIGHT: 70 IN

## 2024-09-04 DIAGNOSIS — S81.811A LACERATION OF RIGHT CALF: Primary | ICD-10-CM

## 2024-09-04 PROCEDURE — 99212 OFFICE O/P EST SF 10 MIN: CPT | Performed by: PHYSICIAN ASSISTANT

## 2024-09-04 NOTE — PROGRESS NOTES
CC:  Follow-up leg laceration    S:  58-year-old gentleman who sustained a laceration of his right calf on 8/19/2024.  He presented to [facility] emergency room where he underwent closure of the laceration initially with staples but after having continued bleeding, was closed with interrupted nylon suture.  He continued to have uncontrolled bleeding and return to the emergency room on 8/21/2024 where he was noted to have a significant hematoma but no active bleeding.  He followed up with Dr. Mcconnell last week where she removed a few of his stitches with the intention of having additional stitches removed this week in follow-up.  While he does admit that the hematoma has decreased in size, he is still having frequent bloody drainage on his bandages daily, in particular with activity.  He is using an Ace wrap for compression on this area as well.  He denies having pain associated with this nor does he have erythema, purulent drainage or loss of function.  He does state he even played golf yesterday without difficulty.    O:  Vital signs noted  Extremities: Posterior right lower extremity with large laceration with interrupted nylon's, moderate size hematoma, some bloody drainage at superior and inferior aspects of the laceration  Psych: Alert and orient x 3, normal affect, normal judgment    A/P:  He still has a moderately large hematoma with some old blood that continues to seep from the laceration.  I am concerned that if I remove the stitches at this appointment the wound will completely open up given the tension.  I have advised him to continue the dressing changes and compression with Ace bandage that he is doing and have scheduled him a follow-up appointment in 1 week with Dr. Mcconnell.  I did also discussed with him the possibility that this may require evacuation of this hematoma if the wound continues to not heal.  All questions were answered and he was willing to proceed with all recommendations.    Alex Brown,  DARIUS

## 2024-09-09 NOTE — PROGRESS NOTES
General Surgery History and Physical       Summary:    Zenon Kahn is a 58 y.o. gentleman with a traumatic wound to the right lower extremity, status post closure.  Wound continues to drain and have swelling consistent with retained hematoma.  The skin is pulling through with the location of the sutures.  Discussed options going forward.  We discussed observation with continued local wound care versus going to the operating room for irrigation and drainage, possible drain placement, and closure of the wound.  He would like to go to the OR to have the wound cleaned and closed again.  The risk, benefits, and alternatives were explained to him and he agreed and wished to proceed.  Will schedule for the next week.     Referring Provider: PIPPA Anderson     Chief Complaint:    Wound to right lower extremity     History of Present Illness:    Zenon Kahn is a 58 y.o. gentleman who fell over a picture frame at his house, which cut his right lower extremity.  He went to the ER at [place] and had staples placed.  He went home and the wound began to bleed so he returned.  Some of the staples were removed and nylon sutures were placed.  He again went home and continued to bleed.  He returned to the ER and all staples were removed and nylon sutures were placed.  Since then has had drainage and pain but is slowly improving.     Past Medical History:   Medical History        Past Medical History:   Diagnosis Date    Anxiety and depression      BPH (benign prostatic hyperplasia)      Colon polyps      Enchondroma of left femur      Erectile dysfunction      Gout      History of COVID-19 11/2020    History of transfusion 4/15/66     When neuroblastoma surgery was preformed    Hx of inguinal hernia repair      Hyperlipidemia      Hypertension      Neuroblastoma       with removal in childhood    Positive colorectal cancer screening using Cologuard test      Prostate cancer 2023    Type 2 diabetes mellitus, without  long-term current use of insulin           Past Surgical History:    Surgical History         Past Surgical History:   Procedure Laterality Date    COLONOSCOPY N/A 06/02/2022     Procedure: COLONOSCOPY to cecum with hot snare and cold forceps polypectomies, saline lift, and tattoo at 85 cm and 30 cm;  Surgeon: Stefan Chase MD;  Location: [facility] ENDOSCOPY;  Service: General;  Laterality: N/A;  pre- positive cologuard  post- polyps    COLONOSCOPY N/A 06/30/2022     Procedure: COLONOSCOPY TO CECUM WITH HOT SNARE POLYPECTOMIES;  Surgeon: Gene Hernandez MD;  Location: [facility] ENDOSCOPY;  Service: General;  Laterality: N/A;  Pre: Postive cologaurd test  Post: polyps    CYSTOSCOPY   2023     No stones, no foreign bodies, no tumors. Urethra normal. BPH present. Median lobe not present.    INGUINAL HERNIA REPAIR Left      NEUROBLASTOMA EXCISION         AS AN INFANT    PROSTATE SURGERY         REZUM procedure         Family History:          Family History   Problem Relation Age of Onset    Cancer Mother           Carnoid tumor of the liver    Heart attack Father      Peripheral vascular disease Father      Hypertension Father      Hyperlipidemia Father      Alcohol abuse Father      Early death Father           57 years old    Diabetes Maternal Grandmother      Malig Hyperthermia Neg Hx        Social History:    Social History   Social History            Socioeconomic History    Marital status:        Spouse name: Shasta    Number of children: 1   Tobacco Use    Smoking status: Never    Smokeless tobacco: Never   Vaping Use    Vaping status: Never Used   Substance and Sexual Activity    Alcohol use: Yes       Alcohol/week: 10.0 standard drinks of alcohol       Types: 10 Drinks containing 0.5 oz of alcohol per week    Drug use: Never    Sexual activity: Not Currently       Partners: Male       Birth control/protection: None         Allergies:   Allergies   No Known Allergies        Medications:     Current  Medications      Current Outpatient Medications:     amLODIPine-valsartan (EXFORGE) 5-320 MG per tablet, TAKE 1 TABLET BY MOUTH EVERY DAY, Disp: 30 tablet, Rfl: 0    clotrimazole-betamethasone (LOTRISONE) 1-0.05 % cream, Apply 1 Application topically to the appropriate area as directed See Admin Instructions. Apply to the affected area every 12 hours, Disp: , Rfl:     DULoxetine (CYMBALTA) 60 MG capsule, Take 1 capsule by mouth Daily., Disp: , Rfl:     glucose blood test strip, ACCU CHECK GUIDE strips. Use to check blood glucose up to 2 times daily., Disp: 100 each, Rfl: 11    metFORMIN (GLUCOPHAGE) 1000 MG tablet, Take 1 tablet by mouth 2 (Two) Times a Day With Meals., Disp: 180 tablet, Rfl: 1    pioglitazone (ACTOS) 30 MG tablet, TAKE 1 TABLET BY MOUTH EVERY DAY, Disp: 90 tablet, Rfl: 0    rosuvastatin (CRESTOR) 10 MG tablet, Take 1 tablet by mouth Daily., Disp: 90 tablet, Rfl: 1    tamsulosin (FLOMAX) 0.4 MG capsule 24 hr capsule, Take 1 capsule by mouth Daily., Disp: , Rfl:     sildenafil (VIAGRA) 100 MG tablet, Take 1 tablet by mouth As Needed for Erectile Dysfunction., Disp: , Rfl:         Radiology/Endoscopy:    X-ray reviewed by me; no other pertinent imaging     Labs:    Labs from 8/20/2024 reviewed by me      Review of Systems:   Influenza-like illness: no fever, no  cough, no  sore throat, no  body aches, no loss of sense of taste or smell, no known exposure to person with Covid-19.  Constitutional: Negative for fevers or chills  HENT: Negative for hearing loss or runny nose  Eyes: Negative for vision changes or scleral icterus  Respiratory: Negative for cough or shortness of breath  Cardiovascular: Negative for chest pain or heart palpitations  Gastrointestinal: Negative for abdominal pain, nausea, vomiting, constipation, melena, or hematochezia  Genitourinary: Negative for hematuria or dysuria  Musculoskeletal: Negative for joint swelling or gait instability  Neurologic: Negative for tremors or  seizures  Psychiatric: Negative for suicidal ideations or depression  All other systems reviewed and negative     Physical Exam:   Constitutional: Well-developed well-nourished, no acute distress  Eyes: Conjunctiva normal, sclera nonicteric  ENMT: Hearing grossly normal, oral mucosa moist  Neck: Supple, trachea midline  Respiratory: Clear to auscultation, normal inspiratory effort  Cardiovascular: Regular rate, no peripheral edema, no jugular venous distention  Skin:  Warm, dry, no rash on visualized skin surfaces, right lower extremity wound with multiple nylon sutures, draining hematoma, swelling, no evidence of infection  Musculoskeletal: Symmetric strength, normal gait  Psychiatric: Alert and oriented ×3, normal affect               Krystin Mcconnell M.D.  General and Endoscopic Surgery  [facility]     [address], [address]  [place], [place], [address]  P: [phone]  F: [phone]

## 2024-09-10 ENCOUNTER — PREP FOR SURGERY (OUTPATIENT)
Dept: OTHER | Facility: HOSPITAL | Age: 59
End: 2024-09-10
Payer: COMMERCIAL

## 2024-09-10 ENCOUNTER — OFFICE VISIT (OUTPATIENT)
Dept: SURGERY | Facility: CLINIC | Age: 59
End: 2024-09-10
Payer: COMMERCIAL

## 2024-09-10 ENCOUNTER — TELEPHONE (OUTPATIENT)
Dept: SURGERY | Facility: CLINIC | Age: 59
End: 2024-09-10
Payer: COMMERCIAL

## 2024-09-10 ENCOUNTER — ANESTHESIA EVENT (OUTPATIENT)
Dept: PERIOP | Facility: HOSPITAL | Age: 59
End: 2024-09-10
Payer: COMMERCIAL

## 2024-09-10 VITALS
HEIGHT: 70 IN | BODY MASS INDEX: 26.33 KG/M2 | DIASTOLIC BLOOD PRESSURE: 82 MMHG | SYSTOLIC BLOOD PRESSURE: 145 MMHG | WEIGHT: 183.9 LBS

## 2024-09-10 DIAGNOSIS — S81.801S LEG WOUND, RIGHT, SEQUELA: Primary | ICD-10-CM

## 2024-09-10 DIAGNOSIS — S81.801D WOUND OF RIGHT LOWER EXTREMITY, SUBSEQUENT ENCOUNTER: Primary | ICD-10-CM

## 2024-09-10 PROBLEM — S81.801A WOUND OF RIGHT LEG: Status: ACTIVE | Noted: 2024-09-10

## 2024-09-10 PROCEDURE — 99213 OFFICE O/P EST LOW 20 MIN: CPT | Performed by: STUDENT IN AN ORGANIZED HEALTH CARE EDUCATION/TRAINING PROGRAM

## 2024-09-10 NOTE — TELEPHONE ENCOUNTER
Voicemail left for patient regarding need for cardiac clearance prior to surgery on 9/17 with Dr. Mcconnell. Below is the email received form Janneth [place] requesting this:  YamilethZenon ([email or web address]) comes in for PAT on 9/12/2024 prior to his wound debridement surgery scheduled on 9/17/2024. Patient had an abnormal EKG on 8/7/2024 and an ECHO and holter monitor was ordered but never completed, patient was supposed to follow up in a month but cancelled and rescheduled for FEB2025.  Anesthesia is requesting cardiac clearance prior to surgery.  Thanks!  Erica Hoang RN   [facility]

## 2024-09-10 NOTE — PROGRESS NOTES
General Surgery History and Physical      Summary:    Zenon Kahn is a 58 y.o. gentleman with a traumatic wound to the right lower extremity, status post closure.  Every other suture removed today in the office.  Continue elevation and local wound care.  Follow-up in 1 week for wound check.    Referring Provider: PIPPA Anderson    Chief Complaint:    Wound to right lower extremity    History of Present Illness:    Zenon Kahn is a 58 y.o. gentleman who fell over a picture frame at his house, which cut his right lower extremity.  He went to the ER at [place] [facility] and had staples placed.  He went home and the wound began to bleed so he returned.  Some of the staples were removed and nylon sutures were placed.  He again went home and continued to bleed.  He returned to the ER and all staples were removed and nylon sutures were placed.  Since then has had drainage and pain but is slowly improving.    Past Medical History:   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    Positive colorectal cancer screening using Cologuard test     Prostate cancer 2023    Type 2 diabetes mellitus, without long-term current use of insulin       Past Surgical History:    Past Surgical History:   Procedure Laterality Date    COLONOSCOPY N/A 06/02/2022    Procedure: COLONOSCOPY to cecum with hot snare and cold forceps polypectomies, saline lift, and tattoo at 85 cm and 30 cm;  Surgeon: Stefan Chase MD;  Location: [facility] ENDOSCOPY;  Service: General;  Laterality: N/A;  pre- positive cologuard  post- polyps    COLONOSCOPY N/A 06/30/2022    Procedure: COLONOSCOPY TO  CECUM WITH HOT SNARE POLYPECTOMIES;  Surgeon: Gene Hernandez MD;  Location: [facility] ENDOSCOPY;  Service: General;  Laterality: N/A;  Pre: Postive cologaurd test  Post: polyps    CYSTOSCOPY  2023    No stones, no foreign bodies, no tumors. Urethra normal. BPH present. Median lobe not present.    INGUINAL HERNIA REPAIR Left     NEUROBLASTOMA EXCISION      AS AN INFANT    PROSTATE SURGERY      REZUM procedure     Family History:    Family History   Problem Relation Age of Onset    Cancer Mother         Carnoid tumor of the liver    Heart attack Father     Peripheral vascular disease Father     Hypertension Father     Hyperlipidemia Father     Alcohol abuse Father     Early death Father         57 years old    Diabetes Maternal Grandmother     Malig Hyperthermia Neg Hx      Social History:    Social History     Socioeconomic History    Marital status:      Spouse name: Shasta    Number of children: 1   Tobacco Use    Smoking status: Never    Smokeless tobacco: Never   Vaping Use    Vaping status: Never Used   Substance and Sexual Activity    Alcohol use: Yes     Alcohol/week: 10.0 standard drinks of alcohol     Types: 10 Drinks containing 0.5 oz of alcohol per week    Drug use: Never    Sexual activity: Not Currently     Partners: Male     Birth control/protection: None     Allergies:   No Known Allergies    Medications:     Current Outpatient Medications:     amLODIPine-valsartan (EXFORGE) 5-320 MG per tablet, TAKE 1 TABLET BY MOUTH EVERY DAY, Disp: 30 tablet, Rfl: 0    clotrimazole-betamethasone (LOTRISONE) 1-0.05 % cream, Apply 1 Application topically to the appropriate area as directed See Admin Instructions. Apply to the affected area every 12 hours, Disp: , Rfl:     DULoxetine (CYMBALTA) 60 MG capsule, Take 1 capsule by mouth Daily., Disp: , Rfl:     glucose blood test strip, ACCU CHECK GUIDE strips. Use to check blood glucose up to 2 times daily., Disp: 100 each, Rfl: 11    metFORMIN (GLUCOPHAGE) 1000  MG tablet, Take 1 tablet by mouth 2 (Two) Times a Day With Meals., Disp: 180 tablet, Rfl: 1    pioglitazone (ACTOS) 30 MG tablet, TAKE 1 TABLET BY MOUTH EVERY DAY, Disp: 90 tablet, Rfl: 0    rosuvastatin (CRESTOR) 10 MG tablet, Take 1 tablet by mouth Daily., Disp: 90 tablet, Rfl: 1    tamsulosin (FLOMAX) 0.4 MG capsule 24 hr capsule, Take 1 capsule by mouth Daily., Disp: , Rfl:     sildenafil (VIAGRA) 100 MG tablet, Take 1 tablet by mouth As Needed for Erectile Dysfunction., Disp: , Rfl:     Radiology/Endoscopy:    X-ray reviewed by me; no other pertinent imaging    Labs:    Labs from 8/20/2024 reviewed by me     Review of Systems:   Influenza-like illness: no fever, no  cough, no  sore throat, no  body aches, no loss of sense of taste or smell, no known exposure to person with Covid-19.  Constitutional: Negative for fevers or chills  HENT: Negative for hearing loss or runny nose  Eyes: Negative for vision changes or scleral icterus  Respiratory: Negative for cough or shortness of breath  Cardiovascular: Negative for chest pain or heart palpitations  Gastrointestinal: Negative for abdominal pain, nausea, vomiting, constipation, melena, or hematochezia  Genitourinary: Negative for hematuria or dysuria  Musculoskeletal: Negative for joint swelling or gait instability  Neurologic: Negative for tremors or seizures  Psychiatric: Negative for suicidal ideations or depression  All other systems reviewed and negative    Physical Exam:   Constitutional: Well-developed well-nourished, no acute distress  Eyes: Conjunctiva normal, sclera nonicteric  ENMT: Hearing grossly normal, oral mucosa moist  Neck: Supple, trachea midline  Respiratory: Clear to auscultation, normal inspiratory effort  Cardiovascular: Regular rate, no peripheral edema, no jugular venous distention  Skin:  Warm, dry, no rash on visualized skin surfaces, right lower extremity wound with multiple nylon sutures, draining hematoma, swelling, no evidence of  infection  Musculoskeletal: Symmetric strength, normal gait  Psychiatric: Alert and oriented ×3, normal affect             Krystin Mcconnell M.D.  General and Endoscopic Surgery  [facility]    [address], [address]  [place], [place], [address]  P: [phone]  F: [phone]

## 2024-09-12 ENCOUNTER — PRE-ADMISSION TESTING (OUTPATIENT)
Dept: PREADMISSION TESTING | Facility: HOSPITAL | Age: 59
End: 2024-09-12
Payer: COMMERCIAL

## 2024-09-12 VITALS
WEIGHT: 181.8 LBS | BODY MASS INDEX: 26.03 KG/M2 | OXYGEN SATURATION: 97 % | HEIGHT: 70 IN | SYSTOLIC BLOOD PRESSURE: 104 MMHG | DIASTOLIC BLOOD PRESSURE: 76 MMHG | HEART RATE: 100 BPM | RESPIRATION RATE: 18 BRPM

## 2024-09-12 LAB
ANION GAP SERPL CALCULATED.3IONS-SCNC: 10.8 MMOL/L (ref 5–15)
BUN SERPL-MCNC: 13 MG/DL (ref 6–20)
BUN/CREAT SERPL: 14.9 (ref 7–25)
CALCIUM SPEC-SCNC: 9.2 MG/DL (ref 8.6–10.5)
CHLORIDE SERPL-SCNC: 97 MMOL/L (ref 98–107)
CO2 SERPL-SCNC: 23.2 MMOL/L (ref 22–29)
CREAT SERPL-MCNC: 0.87 MG/DL (ref 0.76–1.27)
DEPRECATED RDW RBC AUTO: 45.9 FL (ref 37–54)
EGFRCR SERPLBLD CKD-EPI 2021: 100 ML/MIN/1.73
ERYTHROCYTE [DISTWIDTH] IN BLOOD BY AUTOMATED COUNT: 13 % (ref 12.3–15.4)
GLUCOSE SERPL-MCNC: 190 MG/DL (ref 65–99)
HCT VFR BLD AUTO: 37.6 % (ref 37.5–51)
HGB BLD-MCNC: 12.2 G/DL (ref 13–17.7)
MCH RBC QN AUTO: 31.1 PG (ref 26.6–33)
MCHC RBC AUTO-ENTMCNC: 32.4 G/DL (ref 31.5–35.7)
MCV RBC AUTO: 95.9 FL (ref 79–97)
PLATELET # BLD AUTO: 361 10*3/MM3 (ref 140–450)
PMV BLD AUTO: 9.6 FL (ref 6–12)
POTASSIUM SERPL-SCNC: 4.5 MMOL/L (ref 3.5–5.2)
RBC # BLD AUTO: 3.92 10*6/MM3 (ref 4.14–5.8)
SODIUM SERPL-SCNC: 131 MMOL/L (ref 136–145)
WBC NRBC COR # BLD AUTO: 8.2 10*3/MM3 (ref 3.4–10.8)

## 2024-09-12 PROCEDURE — 36415 COLL VENOUS BLD VENIPUNCTURE: CPT

## 2024-09-12 PROCEDURE — 85027 COMPLETE CBC AUTOMATED: CPT | Performed by: STUDENT IN AN ORGANIZED HEALTH CARE EDUCATION/TRAINING PROGRAM

## 2024-09-12 PROCEDURE — 80048 BASIC METABOLIC PNL TOTAL CA: CPT | Performed by: STUDENT IN AN ORGANIZED HEALTH CARE EDUCATION/TRAINING PROGRAM

## 2024-09-12 RX ORDER — RISPERIDONE 0.5 MG/1
0.5 TABLET ORAL DAILY
COMMUNITY
Start: 2024-09-11

## 2024-09-12 NOTE — DISCHARGE INSTRUCTIONS
PRE-ADMISSION TESTING INSTRUCTIONS FOR ADULTS    Take these medications the morning of surgery with a small sip of water: duloxetine and rosuvastatin      Do not take any insulin or diabetes medications the morning of surgery.      No aspirin, advil, aleve, ibuprofen, naproxen, diet pills, decongestants, or herbal/vitamins for a week prior to surgery.       Tylenol/Acetaminophen is okay to take if needed.    General Instructions:    DO NOT EAT SOLID FOOD AFTER MIDNIGHT THE NIGHT BEFORE SURGERY. No gum, mints, or hard candy after midnight the night before surgery.  You may drink clear liquids the day of surgery up until 2 hours before your arrival time.  Clear liquids are liquids you can see through. Nothing RED in color.    Plain water    Sports drinks      Gelatin (Jell-O)  Fruit juices without pulp such as white grape juice and apple juice  Popsicles that contain no fruit or yogurt  Tea or coffee (no cream or milk added)    It is beneficial for you to have a clear drink that contains carbohydrates 2 hours before your arrival time.  We suggest a 20 ounce bottle of Gatorade or Powerade for non-diabetic patients or a 20 ounce bottle of Gatorade Zero or Powerade Zero for diabetic patients.     Patients who avoid smoking, chewing tobacco and alcohol for 4 weeks prior to surgery have a reduced risk of post-operative complications.  If at all possible, quit smoking as many days before surgery as you can.    Do not smoke, use chewing tobacco or drink alcohol the day of surgery    Bring your C-PAP/ BI-PAP machine if you use one.  Wear clean comfortable clothes.  Do not wear contact lenses, lotion, deodorant, or make-up.  Bring a case for your glasses if applicable. You may brush your teeth the morning of surgery.  You may wear dentures/partials, do not put adhesive/glue on them.  Leave all other jewelry and valuables at home.      Preventing a Surgical Site Infection:    Shower the night before and on the morning of  surgery using the chlorhexidine soap you were given.  Use a clean washcloth with the soap.  Place clean sheets on your bed after showering the night before surgery. Do not use the CHG soap on your hair, face, or private areas. Wash your body gently for five (5) minutes. Do not scrub your skin.  Dry with a clean towel and dress in clean clothing.  Do not shave the surgical area for 10 days-2 weeks prior to surgery  because the razor can irritate skin and make it easier to develop an infection.  Make sure you, your family, and all healthcare providers clean their hands with soap and water or an alcohol based hand  before caring for you or your wound.      Day of surgery:    Your surgeon’s office will advise you of your arrival time for the day of surgery.    Upon arrival, a Pre-op nurse and Anesthesia provider will review your health history, obtain vital signs, and answer questions you may have. The anesthesia provider will also discuss the type of anesthesia that will be needed for your procedure, which may include general anesthesia. The only belongings needed at this time will be your home medications and if applicable your C-PAP/BI-PAP machine.  If you are staying overnight your family can leave the rest of your belongings in the car and bring them to your room later.  A Pre-op nurse will start an IV and you may receive medication in preparation for surgery, including something to help you relax.  Your family will be able to see you in the Pre-op area.  While you are in surgery your family should notify the waiting room  if they leave the waiting room area and provide a contact phone number.    IF you have any questions, you can call the Pre-Admission Department at [phone] or your surgeon's office.  Notify your surgeon if  you become sick, have a fever, productive cough, or cannot be here the day of surgery    Please be aware that surgery does come with discomfort.  We want to make  every effort to control your discomfort so please discuss any uncontrolled symptoms with your nurse.   Your doctor will most likely have prescribed pain medications.      If you are going home after surgery, you will receive individualized written care instructions before being discharged.  A responsible adult (over the age of 18) must drive you to and from the hospital on the day of your surgery and stay with you for 24 hours after anesthesia.    If you are staying overnight following surgery, you will be transported to your hospital room following the recovery period.  [facility] has all private rooms.    You may receive a survey regarding the care you received. Your feedback is very important and will be used to collect the necessary data to help us to continue to provide excellent care.     Deductibles and co-payments are collected on the day of service. Please be prepared to pay the required co-pay, deductible or deposit on the day of service as defined by your plan.

## 2024-09-15 DIAGNOSIS — I10 ESSENTIAL HYPERTENSION: ICD-10-CM

## 2024-09-16 ENCOUNTER — OFFICE VISIT (OUTPATIENT)
Dept: INTERNAL MEDICINE | Facility: CLINIC | Age: 59
End: 2024-09-16
Payer: COMMERCIAL

## 2024-09-16 VITALS
SYSTOLIC BLOOD PRESSURE: 110 MMHG | OXYGEN SATURATION: 97 % | BODY MASS INDEX: 26.92 KG/M2 | DIASTOLIC BLOOD PRESSURE: 60 MMHG | TEMPERATURE: 98.4 F | WEIGHT: 188 LBS | HEART RATE: 102 BPM | HEIGHT: 70 IN

## 2024-09-16 DIAGNOSIS — Z01.818 PRE-OPERATIVE EXAMINATION: Primary | ICD-10-CM

## 2024-09-16 PROCEDURE — 99213 OFFICE O/P EST LOW 20 MIN: CPT | Performed by: STUDENT IN AN ORGANIZED HEALTH CARE EDUCATION/TRAINING PROGRAM

## 2024-09-16 RX ORDER — AMLODIPINE AND VALSARTAN 5; 320 MG/1; MG/1
1 TABLET ORAL DAILY
Qty: 30 TABLET | Refills: 0 | Status: SHIPPED | OUTPATIENT
Start: 2024-09-16

## 2024-09-17 ENCOUNTER — ANESTHESIA (OUTPATIENT)
Dept: PERIOP | Facility: HOSPITAL | Age: 59
End: 2024-09-17
Payer: COMMERCIAL

## 2024-09-17 ENCOUNTER — HOSPITAL ENCOUNTER (OUTPATIENT)
Facility: HOSPITAL | Age: 59
Setting detail: HOSPITAL OUTPATIENT SURGERY
Discharge: HOME | End: 2024-09-17
Attending: STUDENT IN AN ORGANIZED HEALTH CARE EDUCATION/TRAINING PROGRAM | Admitting: STUDENT IN AN ORGANIZED HEALTH CARE EDUCATION/TRAINING PROGRAM
Payer: COMMERCIAL

## 2024-09-17 VITALS
SYSTOLIC BLOOD PRESSURE: 147 MMHG | DIASTOLIC BLOOD PRESSURE: 89 MMHG | TEMPERATURE: 98.1 F | HEART RATE: 77 BPM | BODY MASS INDEX: 26.11 KG/M2 | WEIGHT: 182 LBS | OXYGEN SATURATION: 95 % | RESPIRATION RATE: 16 BRPM

## 2024-09-17 DIAGNOSIS — S81.801D WOUND OF RIGHT LOWER EXTREMITY, SUBSEQUENT ENCOUNTER: Primary | ICD-10-CM

## 2024-09-17 LAB — GLUCOSE BLDC GLUCOMTR-MCNC: 189 MG/DL (ref 70–130)

## 2024-09-17 PROCEDURE — 25010000002 SUCCINYLCHOLINE PER 20 MG

## 2024-09-17 PROCEDURE — 25010000002 HYDROMORPHONE 1 MG/ML SOLUTION

## 2024-09-17 PROCEDURE — 25010000002 PHENYLEPHRINE 10 MG/ML SOLUTION

## 2024-09-17 PROCEDURE — 25010000002 DEXAMETHASONE PER 1 MG

## 2024-09-17 PROCEDURE — 25010000002 PROPOFOL 200 MG/20ML EMULSION

## 2024-09-17 PROCEDURE — 25010000002 MIDAZOLAM PER 1MG

## 2024-09-17 PROCEDURE — 25010000002 ONDANSETRON PER 1 MG

## 2024-09-17 PROCEDURE — 82948 REAGENT STRIP/BLOOD GLUCOSE: CPT

## 2024-09-17 PROCEDURE — 25810000003 LACTATED RINGERS PER 1000 ML

## 2024-09-17 PROCEDURE — 11046 DBRDMT MUSC&/FSCA EA ADDL: CPT | Performed by: STUDENT IN AN ORGANIZED HEALTH CARE EDUCATION/TRAINING PROGRAM

## 2024-09-17 PROCEDURE — 25010000002 CEFAZOLIN PER 500 MG: Performed by: STUDENT IN AN ORGANIZED HEALTH CARE EDUCATION/TRAINING PROGRAM

## 2024-09-17 PROCEDURE — 25010000002 FENTANYL CITRATE (PF) 50 MCG/ML SOLUTION

## 2024-09-17 PROCEDURE — 11043 DBRDMT MUSC&/FSCA 1ST 20/<: CPT | Performed by: STUDENT IN AN ORGANIZED HEALTH CARE EDUCATION/TRAINING PROGRAM

## 2024-09-17 RX ORDER — DEXAMETHASONE SODIUM PHOSPHATE 4 MG/ML
4 INJECTION, SOLUTION INTRA-ARTICULAR; INTRALESIONAL; INTRAMUSCULAR; INTRAVENOUS; SOFT TISSUE ONCE AS NEEDED
Status: COMPLETED | OUTPATIENT
Start: 2024-09-17 | End: 2024-09-17

## 2024-09-17 RX ORDER — MAGNESIUM HYDROXIDE 1200 MG/15ML
LIQUID ORAL AS NEEDED
Status: DISCONTINUED | OUTPATIENT
Start: 2024-09-17 | End: 2024-09-17 | Stop reason: HOSPADM

## 2024-09-17 RX ORDER — DIPHENHYDRAMINE HYDROCHLORIDE 50 MG/ML
12.5 INJECTION INTRAMUSCULAR; INTRAVENOUS ONCE AS NEEDED
Status: DISCONTINUED | OUTPATIENT
Start: 2024-09-17 | End: 2024-09-17 | Stop reason: HOSPADM

## 2024-09-17 RX ORDER — DEXMEDETOMIDINE HYDROCHLORIDE 100 UG/ML
INJECTION, SOLUTION INTRAVENOUS AS NEEDED
Status: DISCONTINUED | OUTPATIENT
Start: 2024-09-17 | End: 2024-09-17 | Stop reason: SURG

## 2024-09-17 RX ORDER — FENTANYL CITRATE 50 UG/ML
INJECTION, SOLUTION INTRAMUSCULAR; INTRAVENOUS AS NEEDED
Status: DISCONTINUED | OUTPATIENT
Start: 2024-09-17 | End: 2024-09-17 | Stop reason: SURG

## 2024-09-17 RX ORDER — SODIUM CHLORIDE, SODIUM LACTATE, POTASSIUM CHLORIDE, CALCIUM CHLORIDE 600; 310; 30; 20 MG/100ML; MG/100ML; MG/100ML; MG/100ML
100 INJECTION, SOLUTION INTRAVENOUS ONCE
Status: DISCONTINUED | OUTPATIENT
Start: 2024-09-17 | End: 2024-09-17 | Stop reason: HOSPADM

## 2024-09-17 RX ORDER — ACETAMINOPHEN 500 MG
1000 TABLET ORAL ONCE
Status: COMPLETED | OUTPATIENT
Start: 2024-09-17 | End: 2024-09-17

## 2024-09-17 RX ORDER — FAMOTIDINE 10 MG/ML
20 INJECTION, SOLUTION INTRAVENOUS
Status: COMPLETED | OUTPATIENT
Start: 2024-09-17 | End: 2024-09-17

## 2024-09-17 RX ORDER — SODIUM CHLORIDE 0.9 % (FLUSH) 0.9 %
10 SYRINGE (ML) INJECTION AS NEEDED
Status: DISCONTINUED | OUTPATIENT
Start: 2024-09-17 | End: 2024-09-17 | Stop reason: HOSPADM

## 2024-09-17 RX ORDER — PHENYLEPHRINE HYDROCHLORIDE 10 MG/ML
INJECTION INTRAVENOUS AS NEEDED
Status: DISCONTINUED | OUTPATIENT
Start: 2024-09-17 | End: 2024-09-17 | Stop reason: SURG

## 2024-09-17 RX ORDER — PROMETHAZINE HYDROCHLORIDE 25 MG/1
25 TABLET ORAL ONCE AS NEEDED
Status: DISCONTINUED | OUTPATIENT
Start: 2024-09-17 | End: 2024-09-17 | Stop reason: HOSPADM

## 2024-09-17 RX ORDER — FAMOTIDINE 20 MG/1
20 TABLET, FILM COATED ORAL
Status: COMPLETED | OUTPATIENT
Start: 2024-09-17 | End: 2024-09-17

## 2024-09-17 RX ORDER — SODIUM CHLORIDE 0.9 % (FLUSH) 0.9 %
10 SYRINGE (ML) INJECTION EVERY 12 HOURS SCHEDULED
Status: DISCONTINUED | OUTPATIENT
Start: 2024-09-17 | End: 2024-09-17 | Stop reason: HOSPADM

## 2024-09-17 RX ORDER — MIDAZOLAM HYDROCHLORIDE 2 MG/2ML
1 INJECTION, SOLUTION INTRAMUSCULAR; INTRAVENOUS
Status: DISCONTINUED | OUTPATIENT
Start: 2024-09-17 | End: 2024-09-17 | Stop reason: HOSPADM

## 2024-09-17 RX ORDER — SODIUM CHLORIDE 9 MG/ML
40 INJECTION, SOLUTION INTRAVENOUS AS NEEDED
Status: DISCONTINUED | OUTPATIENT
Start: 2024-09-17 | End: 2024-09-17 | Stop reason: HOSPADM

## 2024-09-17 RX ORDER — SUCCINYLCHOLINE CHLORIDE 20 MG/ML
INJECTION INTRAMUSCULAR; INTRAVENOUS AS NEEDED
Status: DISCONTINUED | OUTPATIENT
Start: 2024-09-17 | End: 2024-09-17 | Stop reason: SURG

## 2024-09-17 RX ORDER — ONDANSETRON 2 MG/ML
4 INJECTION INTRAMUSCULAR; INTRAVENOUS ONCE AS NEEDED
Status: COMPLETED | OUTPATIENT
Start: 2024-09-17 | End: 2024-09-17

## 2024-09-17 RX ORDER — KETAMINE HYDROCHLORIDE 10 MG/ML
INJECTION, SOLUTION INTRAMUSCULAR; INTRAVENOUS AS NEEDED
Status: DISCONTINUED | OUTPATIENT
Start: 2024-09-17 | End: 2024-09-17 | Stop reason: SURG

## 2024-09-17 RX ORDER — ONDANSETRON 2 MG/ML
4 INJECTION INTRAMUSCULAR; INTRAVENOUS ONCE AS NEEDED
Status: DISCONTINUED | OUTPATIENT
Start: 2024-09-17 | End: 2024-09-17 | Stop reason: HOSPADM

## 2024-09-17 RX ORDER — TRAMADOL HYDROCHLORIDE 50 MG/1
50 TABLET ORAL EVERY 6 HOURS PRN
Qty: 10 TABLET | Refills: 0 | Status: SHIPPED | OUTPATIENT
Start: 2024-09-17 | End: 2025-09-17

## 2024-09-17 RX ORDER — FENTANYL CITRATE 50 UG/ML
50 INJECTION, SOLUTION INTRAMUSCULAR; INTRAVENOUS
Status: DISCONTINUED | OUTPATIENT
Start: 2024-09-17 | End: 2024-09-17 | Stop reason: HOSPADM

## 2024-09-17 RX ORDER — PROPOFOL 10 MG/ML
INJECTION, EMULSION INTRAVENOUS AS NEEDED
Status: DISCONTINUED | OUTPATIENT
Start: 2024-09-17 | End: 2024-09-17 | Stop reason: SURG

## 2024-09-17 RX ORDER — HYDROCODONE BITARTRATE AND ACETAMINOPHEN 5; 325 MG/1; MG/1
1 TABLET ORAL ONCE AS NEEDED
Status: COMPLETED | OUTPATIENT
Start: 2024-09-17 | End: 2024-09-17

## 2024-09-17 RX ORDER — SODIUM CHLORIDE, SODIUM LACTATE, POTASSIUM CHLORIDE, CALCIUM CHLORIDE 600; 310; 30; 20 MG/100ML; MG/100ML; MG/100ML; MG/100ML
30 INJECTION, SOLUTION INTRAVENOUS CONTINUOUS
Status: DISCONTINUED | OUTPATIENT
Start: 2024-09-17 | End: 2024-09-17 | Stop reason: HOSPADM

## 2024-09-17 RX ORDER — LIDOCAINE HYDROCHLORIDE 20 MG/ML
INJECTION, SOLUTION INFILTRATION; PERINEURAL AS NEEDED
Status: DISCONTINUED | OUTPATIENT
Start: 2024-09-17 | End: 2024-09-17 | Stop reason: SURG

## 2024-09-17 RX ADMIN — HYDROCODONE BITARTRATE AND ACETAMINOPHEN 1 TABLET: 5; 325 TABLET ORAL at 14:56

## 2024-09-17 RX ADMIN — PROPOFOL 100 MG: 10 INJECTION, EMULSION INTRAVENOUS at 13:16

## 2024-09-17 RX ADMIN — KETAMINE HYDROCHLORIDE 20 MG: 10 INJECTION, SOLUTION INTRAMUSCULAR; INTRAVENOUS at 13:38

## 2024-09-17 RX ADMIN — PHENYLEPHRINE HYDROCHLORIDE 50 MCG: 10 INJECTION INTRAVENOUS at 14:03

## 2024-09-17 RX ADMIN — ACETAMINOPHEN 1000 MG: 500 TABLET ORAL at 12:32

## 2024-09-17 RX ADMIN — HYDROMORPHONE HYDROCHLORIDE 0.25 MG: 1 INJECTION, SOLUTION INTRAMUSCULAR; INTRAVENOUS; SUBCUTANEOUS at 13:52

## 2024-09-17 RX ADMIN — SUCCINYLCHOLINE CHLORIDE 120 MG: 20 INJECTION, SOLUTION INTRAMUSCULAR; INTRAVENOUS at 13:16

## 2024-09-17 RX ADMIN — DEXMEDETOMIDINE 5 MCG: 100 INJECTION, SOLUTION INTRAVENOUS at 13:44

## 2024-09-17 RX ADMIN — HYDROMORPHONE HYDROCHLORIDE 0.25 MG: 1 INJECTION, SOLUTION INTRAMUSCULAR; INTRAVENOUS; SUBCUTANEOUS at 14:44

## 2024-09-17 RX ADMIN — HYDROMORPHONE HYDROCHLORIDE 0.25 MG: 1 INJECTION, SOLUTION INTRAMUSCULAR; INTRAVENOUS; SUBCUTANEOUS at 14:34

## 2024-09-17 RX ADMIN — KETAMINE HYDROCHLORIDE 10 MG: 10 INJECTION, SOLUTION INTRAMUSCULAR; INTRAVENOUS at 13:44

## 2024-09-17 RX ADMIN — DEXMEDETOMIDINE 10 MCG: 100 INJECTION, SOLUTION INTRAVENOUS at 13:36

## 2024-09-17 RX ADMIN — PROPOFOL 80 MG: 10 INJECTION, EMULSION INTRAVENOUS at 13:42

## 2024-09-17 RX ADMIN — FENTANYL CITRATE 50 MCG: 50 INJECTION, SOLUTION INTRAMUSCULAR; INTRAVENOUS at 13:08

## 2024-09-17 RX ADMIN — PROPOFOL 70 MG: 10 INJECTION, EMULSION INTRAVENOUS at 13:11

## 2024-09-17 RX ADMIN — SODIUM CHLORIDE, POTASSIUM CHLORIDE, SODIUM LACTATE AND CALCIUM CHLORIDE 30 ML/HR: 600; 310; 30; 20 INJECTION, SOLUTION INTRAVENOUS at 12:06

## 2024-09-17 RX ADMIN — DEXAMETHASONE SODIUM PHOSPHATE 4 MG: 4 INJECTION INTRA-ARTICULAR; INTRALESIONAL; INTRAMUSCULAR; INTRAVENOUS; SOFT TISSUE at 12:32

## 2024-09-17 RX ADMIN — MIDAZOLAM HYDROCHLORIDE 1 MG: 1 INJECTION, SOLUTION INTRAMUSCULAR; INTRAVENOUS at 12:49

## 2024-09-17 RX ADMIN — LIDOCAINE HYDROCHLORIDE 20 MG: 20 INJECTION, SOLUTION INFILTRATION; PERINEURAL at 13:08

## 2024-09-17 RX ADMIN — ONDANSETRON 4 MG: 2 INJECTION INTRAMUSCULAR; INTRAVENOUS at 12:30

## 2024-09-17 RX ADMIN — CEFAZOLIN 2000 MG: 2 INJECTION, POWDER, FOR SOLUTION INTRAVENOUS at 13:13

## 2024-09-17 RX ADMIN — FAMOTIDINE 20 MG: 10 INJECTION, SOLUTION INTRAVENOUS at 12:30

## 2024-09-17 RX ADMIN — PROPOFOL 150 MG: 10 INJECTION, EMULSION INTRAVENOUS at 13:08

## 2024-09-24 ENCOUNTER — OFFICE VISIT (OUTPATIENT)
Dept: SURGERY | Facility: CLINIC | Age: 59
End: 2024-09-24
Payer: COMMERCIAL

## 2024-09-24 VITALS
WEIGHT: 184.1 LBS | DIASTOLIC BLOOD PRESSURE: 80 MMHG | BODY MASS INDEX: 26.36 KG/M2 | HEIGHT: 70 IN | SYSTOLIC BLOOD PRESSURE: 140 MMHG

## 2024-09-24 DIAGNOSIS — T14.8XXA NONHEALING NONSURGICAL WOUND: Primary | ICD-10-CM

## 2024-09-24 PROCEDURE — 99024 POSTOP FOLLOW-UP VISIT: CPT | Performed by: STUDENT IN AN ORGANIZED HEALTH CARE EDUCATION/TRAINING PROGRAM

## 2024-10-01 ENCOUNTER — OFFICE VISIT (OUTPATIENT)
Dept: SURGERY | Facility: CLINIC | Age: 59
End: 2024-10-01
Payer: COMMERCIAL

## 2024-10-01 VITALS
WEIGHT: 184 LBS | HEIGHT: 70 IN | DIASTOLIC BLOOD PRESSURE: 84 MMHG | SYSTOLIC BLOOD PRESSURE: 126 MMHG | BODY MASS INDEX: 26.34 KG/M2

## 2024-10-01 DIAGNOSIS — Z09 ENCOUNTER FOR FOLLOW-UP: Primary | ICD-10-CM

## 2024-10-01 PROCEDURE — 99212 OFFICE O/P EST SF 10 MIN: CPT | Performed by: PHYSICIAN ASSISTANT

## 2024-10-01 NOTE — PROGRESS NOTES
CC:  Postop follow-up    S:  58-year-old gentleman returning to the office today for a wound check after undergoing incision and drainage of a right leg hematoma with debridement and closure on 9/17/2024.  Overall he has been doing well with only a mild amount of discomfort associated with it.  He has had some yellow drainage that comes from the center portion of his wound but otherwise is doing well.    O:  Vital signs noted  Extremities: Right posterior calf incision healing well with central portion having exudative material with the wound being somewhat more open, interrupted nylon suture in place throughout the wound, remainder of wound healing well    A/P:  Overall his incision is healing very well with only the center portion having some exudative material and being slightly open.  I did elect to leave 3 sutures in place for 1 more week along this area but remove the remainder of his stitches and placed Steri-Strips with Mastisol on the incision.  He is to continue doing regular dressing changes as he needs to and allow the Steri-Strips to fall off on their own.  He will follow-up with me in 1 week for removal of the 3 remaining suture and placement of Steri-Strips over this area at that time.  All questions were answered and he was 1 to proceed with all recommendations.    Alex Brown PA-C

## 2024-10-08 ENCOUNTER — OFFICE VISIT (OUTPATIENT)
Dept: SURGERY | Facility: CLINIC | Age: 59
End: 2024-10-08

## 2024-10-08 VITALS
BODY MASS INDEX: 26.34 KG/M2 | SYSTOLIC BLOOD PRESSURE: 128 MMHG | HEIGHT: 70 IN | DIASTOLIC BLOOD PRESSURE: 86 MMHG | WEIGHT: 184 LBS

## 2024-10-08 DIAGNOSIS — Z09 ENCOUNTER FOR FOLLOW-UP: Primary | ICD-10-CM

## 2024-10-08 NOTE — PROGRESS NOTES
CC:  Wound check    S:  59-year-old gentleman returning to the office for a wound check.  His wound is continue to hold up very well and has minimal serous drainage at the medial aspect.  The remaining 3 sutures are still in place and the remainder of the wound is healing well.    O:  Vitals noted  Skin: Right calf wound healing well with 3 sutures still in place with small eschar at medial aspect of the incision where the 3 sutures remain    A/P:  Sutures were removed and replaced with Mastisol and Steri-Strips.  He was informed that these will fall off over the course of the next week or so.  He may return to all activities as tolerated and need only dressed the wound if it continues to seep.  All questions were answered and he was well to proceed with all recommendations.    Alex Brown PA-C

## 2024-11-26 DIAGNOSIS — I10 ESSENTIAL HYPERTENSION: ICD-10-CM

## 2024-11-26 RX ORDER — AMLODIPINE AND VALSARTAN 5; 320 MG/1; MG/1
1 TABLET ORAL DAILY
Qty: 10 TABLET | Refills: 0 | Status: SHIPPED | OUTPATIENT
Start: 2024-11-26 | End: 2024-12-01 | Stop reason: SDUPTHER

## 2024-12-01 DIAGNOSIS — I10 ESSENTIAL HYPERTENSION: ICD-10-CM

## 2024-12-01 RX ORDER — AMLODIPINE AND VALSARTAN 5; 320 MG/1; MG/1
1 TABLET ORAL DAILY
Qty: 90 TABLET | Refills: 0 | Status: SHIPPED | OUTPATIENT
Start: 2024-12-01

## 2025-02-12 DIAGNOSIS — E11.65 TYPE 2 DIABETES MELLITUS WITH HYPERGLYCEMIA, WITHOUT LONG-TERM CURRENT USE OF INSULIN: ICD-10-CM

## 2025-02-12 DIAGNOSIS — I10 ESSENTIAL HYPERTENSION: Primary | ICD-10-CM

## 2025-02-13 LAB
ALBUMIN SERPL-MCNC: 4.3 G/DL (ref 3.5–5.2)
ALBUMIN/CREAT UR: 30 MG/G CREAT (ref 0–29)
ALBUMIN/GLOB SERPL: 1.4 G/DL
ALP SERPL-CCNC: 115 U/L (ref 39–117)
ALT SERPL-CCNC: 16 U/L (ref 1–41)
AST SERPL-CCNC: 17 U/L (ref 1–40)
BASOPHILS # BLD AUTO: 0.04 10*3/MM3 (ref 0–0.2)
BASOPHILS NFR BLD AUTO: 0.5 % (ref 0–1.5)
BILIRUB SERPL-MCNC: 0.5 MG/DL (ref 0–1.2)
BUN SERPL-MCNC: 14 MG/DL (ref 6–20)
BUN/CREAT SERPL: 14.7 (ref 7–25)
CALCIUM SERPL-MCNC: 9.7 MG/DL (ref 8.6–10.5)
CHLORIDE SERPL-SCNC: 96 MMOL/L (ref 98–107)
CHOLEST SERPL-MCNC: 156 MG/DL (ref 0–200)
CO2 SERPL-SCNC: 26.1 MMOL/L (ref 22–29)
CREAT SERPL-MCNC: 0.95 MG/DL (ref 0.76–1.27)
CREAT UR-MCNC: 94.1 MG/DL
EGFRCR SERPLBLD CKD-EPI 2021: 92.2 ML/MIN/1.73
EOSINOPHIL # BLD AUTO: 0.31 10*3/MM3 (ref 0–0.4)
EOSINOPHIL NFR BLD AUTO: 3.8 % (ref 0.3–6.2)
ERYTHROCYTE [DISTWIDTH] IN BLOOD BY AUTOMATED COUNT: 13.4 % (ref 12.3–15.4)
GLOBULIN SER CALC-MCNC: 3.1 GM/DL
GLUCOSE SERPL-MCNC: 244 MG/DL (ref 65–99)
HBA1C MFR BLD: 9.2 % (ref 4.8–5.6)
HCT VFR BLD AUTO: 43 % (ref 37.5–51)
HDLC SERPL-MCNC: 65 MG/DL (ref 40–60)
HGB BLD-MCNC: 14.5 G/DL (ref 13–17.7)
IMM GRANULOCYTES # BLD AUTO: 0.03 10*3/MM3 (ref 0–0.05)
IMM GRANULOCYTES NFR BLD AUTO: 0.4 % (ref 0–0.5)
LDLC SERPL CALC-MCNC: 77 MG/DL (ref 0–100)
LYMPHOCYTES # BLD AUTO: 1.8 10*3/MM3 (ref 0.7–3.1)
LYMPHOCYTES NFR BLD AUTO: 22 % (ref 19.6–45.3)
MCH RBC QN AUTO: 30.7 PG (ref 26.6–33)
MCHC RBC AUTO-ENTMCNC: 33.7 G/DL (ref 31.5–35.7)
MCV RBC AUTO: 90.9 FL (ref 79–97)
MICROALBUMIN UR-MCNC: 28.5 UG/ML
MONOCYTES # BLD AUTO: 0.74 10*3/MM3 (ref 0.1–0.9)
MONOCYTES NFR BLD AUTO: 9 % (ref 5–12)
NEUTROPHILS # BLD AUTO: 5.26 10*3/MM3 (ref 1.7–7)
NEUTROPHILS NFR BLD AUTO: 64.3 % (ref 42.7–76)
NRBC BLD AUTO-RTO: 0 /100 WBC (ref 0–0.2)
PLATELET # BLD AUTO: 271 10*3/MM3 (ref 140–450)
POTASSIUM SERPL-SCNC: 4.7 MMOL/L (ref 3.5–5.2)
PROT SERPL-MCNC: 7.4 G/DL (ref 6–8.5)
RBC # BLD AUTO: 4.73 10*6/MM3 (ref 4.14–5.8)
SODIUM SERPL-SCNC: 137 MMOL/L (ref 136–145)
TRIGL SERPL-MCNC: 73 MG/DL (ref 0–150)
VLDLC SERPL CALC-MCNC: 14 MG/DL (ref 5–40)
WBC # BLD AUTO: 8.18 10*3/MM3 (ref 3.4–10.8)

## 2025-02-20 ENCOUNTER — OFFICE VISIT (OUTPATIENT)
Dept: INTERNAL MEDICINE | Facility: CLINIC | Age: 60
End: 2025-02-20
Payer: COMMERCIAL

## 2025-02-20 VITALS
BODY MASS INDEX: 26.77 KG/M2 | WEIGHT: 187 LBS | HEIGHT: 70 IN | HEART RATE: 108 BPM | OXYGEN SATURATION: 96 % | SYSTOLIC BLOOD PRESSURE: 120 MMHG | DIASTOLIC BLOOD PRESSURE: 80 MMHG | TEMPERATURE: 98.1 F

## 2025-02-20 DIAGNOSIS — R41.3 MEMORY CHANGES: ICD-10-CM

## 2025-02-20 DIAGNOSIS — E11.65 TYPE 2 DIABETES MELLITUS WITH HYPERGLYCEMIA, WITHOUT LONG-TERM CURRENT USE OF INSULIN: ICD-10-CM

## 2025-02-20 DIAGNOSIS — G47.00 INSOMNIA, UNSPECIFIED TYPE: ICD-10-CM

## 2025-02-20 DIAGNOSIS — Z00.00 ANNUAL PHYSICAL EXAM: Primary | ICD-10-CM

## 2025-02-20 PROCEDURE — 99396 PREV VISIT EST AGE 40-64: CPT | Performed by: STUDENT IN AN ORGANIZED HEALTH CARE EDUCATION/TRAINING PROGRAM

## 2025-02-20 PROCEDURE — 99214 OFFICE O/P EST MOD 30 MIN: CPT | Performed by: STUDENT IN AN ORGANIZED HEALTH CARE EDUCATION/TRAINING PROGRAM

## 2025-02-20 RX ORDER — LEMBOREXANT 10 MG/1
10 TABLET, FILM COATED ORAL NIGHTLY
Qty: 30 TABLET | Refills: 0 | Status: SHIPPED | OUTPATIENT
Start: 2025-02-20

## 2025-02-20 RX ORDER — DULOXETIN HYDROCHLORIDE 30 MG/1
30 CAPSULE, DELAYED RELEASE ORAL DAILY
COMMUNITY
Start: 2025-01-20

## 2025-02-20 NOTE — PROGRESS NOTES
Bakari Quevedo DO, FACP  Internal Medicine  [facility]  [address], [address]  [place] [address]  [phone]    Chief Complaint  Annual checkup    HISTORY    Zenon Kahn is a 59 y.o. male who presents to the office today as a  an established patient for their annual preventative exam.      No hospitalization(s) within the last year.     Current exercise regimen: not exercising     Status of chronic medical conditions:    HLD: takes rosuvastatin 10 mg daily for primary prevention , no side effects   Lab Results   Component Value Date    CHLPL 156 02/12/2025    TRIG 73 02/12/2025    HDL 65 (H) 02/12/2025    LDL 77 02/12/2025        Erectile dysfunction s/p urowave: takes sildenafil 100 mg daily as needed from [facility]     Anxiety and Depression: remembers being on wellbutrin, trintellix, buspirone, zoloft. Also trialed amitriptyline without improvement. Currently on duloxetine 90 mg daily and risperidone 1 mg daily by Suzie DAS.     Insomnia: issue is with both falling asleep and staying asleep. He can lay in bed for 3-4 hours without falling asleep. He wakes up up to 2 times nightly but it doesn't take him long time to get back to sleep. He sometimes uses a CBD gummy that makes him groggy. In Feb 2024 we did a 1 month trial of Dayvigo 5 mg nightly but had no improvement. He asks about ambien today.    HTN: takes amlodipine - valsartan 5-320 mg daily. Checks BP a few times per week and it is low 130s/80-90      Type 2 diabetes with microalbuminuria :  Takes pioglitazone 30 mg daily and metformin 1000 mg twice daily. Blood sugar is 170-180 when he wakes up in the morning, checks a few time per week. States he doesn't eat a lot of sweets or breads. Doesn't snack much at all.  Lab Results   Component Value Date    HGBA1C 9.20 (H) 02/12/2025        Prostate cancer/ED/BPH: [facility] , s/p REZUM procedure for BPH. On active surveillance for prostate cancer .  Takes tamsulosin 0.4 mg nightly.        Gout : no flare in over 20 years.   Lab Results   Component Value Date    URICACID 5.1 02/07/2024       Any other concerns regarding their health today:     States he has a real problem with trying to stay focused and remembering. He might walk into a room and forget why he is there. Going on for 3-4 months. He always gets back to the information but is just slower. He thought in the past he had a photographic memory but now not the case. States he has missed a payment or two on bills because of forgetting. States he has been taking prevagen for around a month with no change. Both recent information and old information seems impacted. States he has been doing crossword puzzles to try to help.     Health Maintenance Summary            Overdue - DIABETIC EYE EXAM (Yearly) Never done      No completion history exists for this topic.              Overdue - Hepatitis B (1 of 3 - 19+ 3-dose series) Never done      No completion history exists for this topic.              Overdue - INFLUENZA VACCINE (Yearly - July to March) Overdue since 7/1/2024 12/08/2022  Imm Admin: Fluzone (or Fluarix & Flulaval for VFC) >6mos    02/28/2022  Imm Admin: Flublok 18+yrs    11/28/2021  Imm Admin: Flublok 18+yrs    12/17/2020  Imm Admin: Influenza, Unspecified    12/17/2020  Imm Admin: Flublok 18+yrs    Only the first 5 history entries have been loaded, but more history exists.              Overdue - COVID-19 Vaccine (4 - 2024-25 season) Overdue since 9/1/2024      10/07/2021  Imm Admin: COVID-19 (PFIZER) Purple Cap Monovalent    04/06/2021  Imm Admin: COVID-19 (PFIZER) Purple Cap Monovalent    03/16/2021  Imm Admin: COVID-19 (PFIZER) Purple Cap Monovalent              BMI FOLLOWUP (Yearly) Next due on 8/7/2025 08/07/2024  SmartData: BMI EDUCATION FOR OVERWEIGHT    05/25/2022  SmartData: BMI EDUCATION FOR OVERWEIGHT              HEMOGLOBIN A1C (Every 6 Months) Next due on 8/12/2025       02/12/2025  Hemoglobin A1C component of Hemoglobin A1c    08/07/2024  Hemoglobin A1C component of Hemoglobin A1c    02/07/2024  Hemoglobin A1C component of Hemoglobin A1c    12/08/2022  Hemoglobin A1C component of Hemoglobin A1c    08/24/2022  Hemoglobin A1C component of Hemoglobin A1c    Only the first 5 history entries have been loaded, but more history exists.              LIPID PANEL (Yearly) Next due on 2/12/2026 02/12/2025  Lipid Panel    02/07/2024  Lipid Panel    04/15/2022  Lipid Panel With LDL/HDL Ratio    07/31/2020  LIPID PANEL    01/31/2019  LIPID PANEL    Only the first 5 history entries have been loaded, but more history exists.              URINE MICROALBUMIN-CREATININE RATIO (uACR) (Yearly) Next due on 2/12/2026 02/12/2025  Microalbumin/Creatinine Ratio component of Microalbumin / Creatinine Urine Ratio - Urine, Clean Catch    02/07/2024  Microalbumin/Creatinine Ratio component of Microalbumin / Creatinine Urine Ratio - Urine, Clean Catch    04/15/2022  Microalbumin/Creatinine Ratio component of Microalbumin / Creatinine Urine Ratio - Urine, Clean Catch    10/27/2014  Microalbumin/Creatinine Ratio component of Microalbumin / creatinine urine ratio    05/13/2014  Microalbumin/Creatinine Ratio component of Microalbumin / creatinine urine ratio    Only the first 5 history entries have been loaded, but more history exists.              ANNUAL PHYSICAL (Yearly) Next due on 2/20/2026 02/20/2025  Done    02/07/2024  Done    05/20/2022  Done              COLORECTAL CANCER SCREENING (COLONOSCOPY - Every 10 Years) Tentatively due on 6/30/2032 06/30/2022  Surgical Procedure: COLONOSCOPY    06/30/2022  COLONOSCOPY    06/02/2022  COLONOSCOPY    06/02/2022  Surgical Procedure: COLONOSCOPY    05/10/2022  SCANNED - COLOGUARD    Only the first 5 history entries have been loaded, but more history exists.              TDAP/TD VACCINES (3 - Td or Tdap) Next due on 8/20/2034 08/20/2024  Imm  Admin: Tdap    02/07/2019  Imm Admin: Tdap              HEPATITIS C SCREENING  Completed      01/31/2019  HEPATITIS C ANTIBODY              Pneumococcal Vaccine 50+ (Series Information) Completed      08/24/2022  Imm Admin: Pneumococcal Conjugate 20-Valent (PCV20)              Discontinued - ZOSTER VACCINE  Discontinued      12/17/2020  Imm Admin: Zoster, Unspecified    12/17/2020  Imm Admin: Shingrix    08/20/2020  Imm Admin: Zoster, Unspecified    08/20/2020  Imm Admin: Shingrix                     No Known Allergies     Outpatient Medications Marked as Taking for the 2/20/25 encounter (Office Visit) with Bakari Quevedo,    Medication Sig Dispense Refill    amLODIPine-valsartan (EXFORGE) 5-320 MG per tablet Take 1 tablet by mouth Daily. 90 tablet 0    clotrimazole-betamethasone (LOTRISONE) 1-0.05 % cream Apply 1 Application topically to the appropriate area as directed See Admin Instructions. Apply to the affected area every 12 hours      DULoxetine (CYMBALTA) 30 MG capsule Take 1 capsule by mouth Daily. Take in combination with 60 mg to equal 90 mg daily.      DULoxetine (CYMBALTA) 60 MG capsule Take 1 capsule by mouth Daily.      glucose blood test strip ACCU CHECK GUIDE strips. Use to check blood glucose up to 2 times daily. 100 each 11    metFORMIN (GLUCOPHAGE) 1000 MG tablet Take 1 tablet by mouth 2 (Two) Times a Day With Meals. 180 tablet 1    pioglitazone (ACTOS) 30 MG tablet TAKE 1 TABLET BY MOUTH EVERY DAY 90 tablet 0    risperiDONE (risperDAL) 0.5 MG tablet Take 2 tablets by mouth Daily.      rosuvastatin (CRESTOR) 10 MG tablet Take 1 tablet by mouth Daily. 90 tablet 1    sildenafil (VIAGRA) 100 MG tablet Take 1 tablet by mouth As Needed for Erectile Dysfunction.      tamsulosin (FLOMAX) 0.4 MG capsule 24 hr capsule Take 1 capsule by mouth Daily.         Past Medical History:   Diagnosis Date    Anxiety and depression     BPH (benign prostatic hyperplasia)     Colon polyps     Enchondroma of left femur      Erectile dysfunction     Gout     History of COVID-19 11/2020    History of transfusion 4/15/66    When neuroblastoma surgery was preformed    Hx of inguinal hernia repair     Hyperlipidemia     Hypertension     Laceration of right calf     Neuroblastoma     with removal in childhood    Positive colorectal cancer screening using Cologuard test     Prostate cancer 2023    Type 2 diabetes mellitus, without long-term current use of insulin      Past Surgical History:   Procedure Laterality Date    COLONOSCOPY N/A 06/02/2022    Procedure: COLONOSCOPY to cecum with hot snare and cold forceps polypectomies, saline lift, and tattoo at 85 cm and 30 cm;  Surgeon: Stefan Chase MD;  Location:  [facility];  Service: General;  Laterality: N/A;  pre- positive cologuard  post- polyps    COLONOSCOPY N/A 06/30/2022    Procedure: COLONOSCOPY TO CECUM WITH HOT SNARE POLYPECTOMIES;  Surgeon: Gene Hernandez MD;  Location:  [facility];  Service: General;  Laterality: N/A;  Pre: Postive cologaurd test  Post: polyps    CYSTOSCOPY  2023    No stones, no foreign bodies, no tumors. Urethra normal. BPH present. Median lobe not present.    INCISION AND DRAINAGE LEG Right 9/17/2024    Procedure: Right leg wound debridement and closure;  Surgeon: Krystin Mcconnell MD;  Location:  [facility];  Service: General;  Laterality: Right;    INGUINAL HERNIA REPAIR Left     NEUROBLASTOMA EXCISION      AS AN INFANT    PROSTATE SURGERY      REZUM procedure     Family History   Problem Relation Age of Onset    Cancer Mother         Carnoid tumor of the liver    Heart attack Father     Peripheral vascular disease Father     Hypertension Father     Hyperlipidemia Father     Alcohol abuse Father     Early death Father         57 years old    Diabetes Maternal Grandmother     Malig Hyperthermia Neg Hx     reports that he has never smoked. He has never used smokeless tobacco. He reports that he does not currently use alcohol after a past usage  "of about 10.0 standard drinks of alcohol per week. He reports that he does not use drugs.    Immunization History   Administered Date(s) Administered    COVID-19 (PFIZER) Purple Cap Monovalent 03/16/2021, 04/06/2021, 10/07/2021    Flu Vaccine Split Quad 01/31/2019    Flublok 18+yrs 12/17/2020, 11/28/2021, 02/28/2022    Fluzone (or Fluarix & Flulaval for VFC) >6mos 01/31/2019, 12/08/2022    Hepatitis A 08/20/2020    Influenza, Unspecified 12/17/2020    Pneumococcal Conjugate 20-Valent (PCV20) 08/24/2022    Shingrix 08/20/2020, 12/17/2020    Tdap 02/07/2019, 08/20/2024    Zoster, Unspecified 08/20/2020, 12/17/2020        OBJECTIVE    Vital Signs:   /80   Pulse 108   Temp 98.1 °F (36.7 °C) (Infrared)   Ht 177.8 cm (70\")   Wt 84.8 kg (187 lb)   SpO2 96%   BMI 26.83 kg/m²     Physical Exam  Vitals reviewed.   Constitutional:       General: He is not in acute distress.     Appearance: Normal appearance. He is not ill-appearing.   HENT:      Head: Normocephalic and atraumatic.      Right Ear: Tympanic membrane, ear canal and external ear normal. There is no impacted cerumen.      Left Ear: Tympanic membrane, ear canal and external ear normal. There is no impacted cerumen.      Mouth/Throat:      Mouth: Mucous membranes are moist.      Pharynx: No oropharyngeal exudate or posterior oropharyngeal erythema.      Comments: Mallampati class III  Eyes:      General: No scleral icterus.     Extraocular Movements: Extraocular movements intact.      Conjunctiva/sclera: Conjunctivae normal.      Pupils: Pupils are equal, round, and reactive to light.   Cardiovascular:      Rate and Rhythm: Regular rhythm. Tachycardia present.      Heart sounds: Normal heart sounds. No murmur heard.  Pulmonary:      Effort: Pulmonary effort is normal. No respiratory distress.      Breath sounds: Normal breath sounds. No wheezing.   Abdominal:      General: Bowel sounds are normal. There is no distension.      Palpations: Abdomen is soft. "      Tenderness: There is no abdominal tenderness. There is no guarding.   Musculoskeletal:      Cervical back: Neck supple.      Right lower leg: No edema.      Left lower leg: No edema.   Lymphadenopathy:      Cervical: No cervical adenopathy.   Skin:     General: Skin is warm and dry.      Coloration: Skin is not jaundiced.   Neurological:      General: No focal deficit present.      Mental Status: He is alert and oriented to person, place, and time.      Cranial Nerves: No cranial nerve deficit.      Motor: No weakness.   Psychiatric:         Mood and Affect: Mood normal.         Behavior: Behavior normal.         Thought Content: Thought content normal.                   The 10-year ASCVD risk score (Heriberto MANUEL, et al., 2019) is: 10.1%    Values used to calculate the score:      Age: 59 years      Sex: Male      Is Non- : No      Diabetic: Yes      Tobacco smoker: No      Systolic Blood Pressure: 120 mmHg      Is BP treated: Yes      HDL Cholesterol: 65 mg/dL      Total Cholesterol: 156 mg/dL           ASSESSMENT & PLAN     Annual Preventative Health Examination  -Age and sex appropriate physical exam performed and documented. Updated past medical, family, social and surgical histories as well as allergies and care team list. Addressed care gaps listed in the medical record.  -Encouraged annual dental and vision exams as part of their overall health.  -Encouraged minimum of 30 minutes or more of exercise at a brisk walk or higher 5 days per week combined with a well-balanced diet.   -Immunizations reviewed and updated in EMR. Influenza and COVID19 recommended.  -Lipid screening:   Lipid Panel          2/12/2025    11:30   Lipid Panel   Total Cholesterol 156    Triglycerides 73    HDL Cholesterol 65    VLDL Cholesterol 14    LDL Cholesterol  77     Patient is already on statin therapy.   -Depression and Anxiety screening: Patient has known diagnosis of depression and / or  anxiety.  -Diabetes screening: Patient already has a diagnosis of diabetes mellitus and is being treated.   -Tobacco use screening: Conducted and addressed if indicated.   -Alcohol use screening: Conducted and addressed if indicated.   -Illicit drug screening: Conducted and addressed if indicated.   -Abdominal aortic aneurysm screening: AAA screening is not indicated as patient is less than 65 years of age.  -Hypertension screening: Patient with known diagnosis of hypertension and is receiving treatment.  -HIV screening: Patient has already received HIV screening and it was negative. Patient declined repeat screening.  -Hepatitis C virus screening:  Patient has already completed Hepatitis C screening. Negative screening on file.   -Syphilis screening: Syphilis screening not indicated.  -Hepatitis B virus screening: Screening not indicated, not in a high-risk group.  -Colon cancer screening: he is 1.5 years past due, again recommended he get this scheduled.  -Lung cancer screening: Patient has never smoked.  -Prostate cancer screening: Patient has known prostate cancer, screening not indicated. Follows with urology.    Follow up in 1 year for annual physical exam.    Patient/family had no further questions at this time and verbalized understanding of the plan discussed today.     A problem-based visit was also conducted on the same day, see below for assessment and plan    Diagnoses and all orders for this visit:    1. Insomnia, unspecified type (Primary)  -issue is with both falling asleep and staying asleep. He can lay in bed for 3-4 hours without falling asleep. He wakes up up to 2 times nightly but it doesn't take him long time to get back to sleep. He sometimes uses a CBD gummy that makes him groggy. In Feb 2024 we did a 1 month trial of Dayvigo 5 mg nightly but had no improvement. He asks about ambien today.  -advised pt that I do not routinely use ambien due to risk of dependence   -again advised him that no  sleep medication is studied in long term use and he should consider the CBT-I therapy that we previously discussed.   -I am open to a trial of higher dose Dayvigo 10 mg daily or a similar class alternative depending on his insurance coverage. He is agreeable and a Rx was sent today.    2. Type 2 diabetes mellitus with hyperglycemia, without long-term current use of insulin  -A1c trends on file for this patient:   A1C Last 3 Results          8/7/2024    10:28 2/12/2025    11:30   HGBA1C Last 3 Results   Hemoglobin A1C 7.60  9.20      -Goal A1c for this patient is less than 6.5%  -Current diabetes regimen:Takes pioglitazone 30 mg daily and metformin 1000 mg twice daily. Blood sugar is 170-180 when he wakes up in the morning, checks a few time per week. States he doesn't eat a lot of sweets or breads. Doesn't snack much at all.  -Changes made to diabetes regimen today: A1c has dramatically increased today and I recommend he get back into regular exercise and continue to focus on diet . He asks about a diabetes medication that can help with weight loss. I am agreeable given his increase in A1c. Introduced Mounjaro. Discussed correct use of the medication as well as common side effects and how the medication works. Discussed contraindications including personal or family history of thyroid cancer and he denies that history. 1 month sample of Mounjaro 2.5 mg weekly provided and a continuing Rx for 5 mg for the following 2 months provided. Will follow up closely in 3 months.   -Diabetic kidney disease screening: up to date and positive with improvement to near normal - pt already on ACEi/ARB.       3. Memory changes   -States he has a real problem with trying to stay focused and remembering. He might walk into a room and forget why he is there. Going on for 3-4 months. He always gets back to the information but is just slower. He thought in the past he had a photographic memory but now not the case. States he has missed a  payment or two on bills because of forgetting. States he has been taking prevagen for around a month with no change. Both recent information and old information seems impacted. States he has been doing crossword puzzles to try to help.   -TSH in the last year normal.  -check b12 today  -slightly concerning that he has had functional impacts with paying bills. If B12 is normal I recommend he see neuropsychology for a formal memory evaluation. Contact information provided for self referral. He is agreeable.           The following social determinates of health impact the patient's medical decision making: No social determinates of health were factored in to today's visit.     Follow Up  Return in about 3 months (around 5/20/2025) for Recheck.

## 2025-02-21 LAB
INTERPRETATION: NORMAL
VIT B12 SERPL-MCNC: 544 PG/ML (ref 232–1245)

## 2025-03-18 DIAGNOSIS — I10 ESSENTIAL HYPERTENSION: ICD-10-CM

## 2025-03-18 RX ORDER — AMLODIPINE AND VALSARTAN 5; 320 MG/1; MG/1
1 TABLET ORAL DAILY
Qty: 90 TABLET | Refills: 0 | Status: SHIPPED | OUTPATIENT
Start: 2025-03-18

## 2025-04-12 DIAGNOSIS — G47.00 INSOMNIA, UNSPECIFIED TYPE: ICD-10-CM

## 2025-04-14 RX ORDER — LEMBOREXANT 10 MG/1
1 TABLET, FILM COATED ORAL
Qty: 30 TABLET | Refills: 2 | Status: SHIPPED | OUTPATIENT
Start: 2025-04-14

## 2025-05-19 DIAGNOSIS — E11.65 TYPE 2 DIABETES MELLITUS WITH HYPERGLYCEMIA, WITHOUT LONG-TERM CURRENT USE OF INSULIN: ICD-10-CM

## 2025-05-19 DIAGNOSIS — E78.2 MIXED HYPERLIPIDEMIA: ICD-10-CM

## 2025-05-19 RX ORDER — ROSUVASTATIN CALCIUM 10 MG/1
10 TABLET, COATED ORAL DAILY
Qty: 90 TABLET | Refills: 1 | Status: SHIPPED | OUTPATIENT
Start: 2025-05-19

## 2025-05-21 ENCOUNTER — OFFICE VISIT (OUTPATIENT)
Dept: INTERNAL MEDICINE | Facility: CLINIC | Age: 60
End: 2025-05-21
Payer: COMMERCIAL

## 2025-05-21 VITALS
OXYGEN SATURATION: 97 % | WEIGHT: 170 LBS | HEIGHT: 70 IN | HEART RATE: 122 BPM | TEMPERATURE: 97.8 F | SYSTOLIC BLOOD PRESSURE: 111 MMHG | BODY MASS INDEX: 24.34 KG/M2 | DIASTOLIC BLOOD PRESSURE: 80 MMHG

## 2025-05-21 DIAGNOSIS — Z79.899 HIGH RISK MEDICATION USE: ICD-10-CM

## 2025-05-21 DIAGNOSIS — E11.65 TYPE 2 DIABETES MELLITUS WITH HYPERGLYCEMIA, WITHOUT LONG-TERM CURRENT USE OF INSULIN: ICD-10-CM

## 2025-05-21 DIAGNOSIS — G47.00 INSOMNIA, UNSPECIFIED TYPE: ICD-10-CM

## 2025-05-21 DIAGNOSIS — M1A.09X0 CHRONIC GOUT OF MULTIPLE SITES, UNSPECIFIED CAUSE: Primary | ICD-10-CM

## 2025-05-21 DIAGNOSIS — R00.0 TACHYCARDIA: ICD-10-CM

## 2025-05-21 PROCEDURE — 93000 ELECTROCARDIOGRAM COMPLETE: CPT | Performed by: STUDENT IN AN ORGANIZED HEALTH CARE EDUCATION/TRAINING PROGRAM

## 2025-05-21 PROCEDURE — 99214 OFFICE O/P EST MOD 30 MIN: CPT | Performed by: STUDENT IN AN ORGANIZED HEALTH CARE EDUCATION/TRAINING PROGRAM

## 2025-05-21 RX ORDER — ESZOPICLONE 1 MG/1
1 TABLET, FILM COATED ORAL NIGHTLY
Qty: 30 TABLET | Refills: 0 | Status: SHIPPED | OUTPATIENT
Start: 2025-05-21

## 2025-05-21 NOTE — PROGRESS NOTES
"  Bakari Quevedo DO, [facility]  Internal Medicine  [facility]  [address], [address]  [place] [address]  [phone]    Chief Complaint  Diabetes, insomnia     SUBJECTIVE    Zenon Kahn is a 59 y.o. male who presents to the office today as an established patient that last saw me on 2/20/2025.     Gout : no flare in over 20 years. Not requiring medication currently.   Lab Results   Component Value Date    URICACID 5.1 02/07/2024     Type 2 diabetes with microalbuminuria :  Prescribed pioglitazone 30 mg daily, metformin 1000 mg twice daily and at last visit Mounjaro was started and he has increased up to 5 mg weekly. States he has not been taking pioglitazone or metformin since last visit because he thought he was supposed to stop it. Blood sugar fasting is around 110. States he has lost 16 lbs since last visit. States he notices his appetite decreased \"I have to force myself to eat, nothing tastes good\". He has felt some lightheadedness.   Lab Results   Component Value Date    HGBA1C 9.20 (H) 02/12/2025     Insomnia: at last visit increased Dayvigo from 5 mg to 10 mg nightly. issue is with both falling asleep and staying asleep. He can lay in bed for 3-4 hours without falling asleep. He wakes up up to 2 times nightly . No improvement with Dayvigo 10 mg nightly. Slept 6 hours in last 2 days. On average he sleeps 4.5 hours nightly on average. States he was on trazodone in the past \"made me tired toward the end of the day\". CBI-I therapy discussed last visit \"I thought about it\" but did not enroll.     No Known Allergies     Outpatient Medications Marked as Taking for the 5/21/25 encounter (Office Visit) with Bakari Quevedo DO   Medication Sig Dispense Refill    amLODIPine-valsartan (EXFORGE) 5-320 MG per tablet TAKE 1 TABLET BY MOUTH EVERY DAY 90 tablet 0    clotrimazole-betamethasone (LOTRISONE) 1-0.05 % cream Apply 1 Application topically to the appropriate area as directed See Admin " "Instructions. Apply to the affected area every 12 hours      DULoxetine (CYMBALTA) 30 MG capsule Take 1 capsule by mouth Daily. Take in combination with 60 mg to equal 90 mg daily.      DULoxetine (CYMBALTA) 60 MG capsule Take 1 capsule by mouth Daily.      glucose blood test strip ACCU CHECK GUIDE strips. Use to check blood glucose up to 2 times daily. 100 each 11    metFORMIN (GLUCOPHAGE) 1000 MG tablet Take 1 tablet by mouth 2 (Two) Times a Day With Meals. 180 tablet 1    rosuvastatin (CRESTOR) 10 MG tablet Take 1 tablet by mouth Daily. 90 tablet 1    sildenafil (VIAGRA) 100 MG tablet Take 1 tablet by mouth As Needed for Erectile Dysfunction.      tamsulosin (FLOMAX) 0.4 MG capsule 24 hr capsule Take 1 capsule by mouth Daily.      Tirzepatide 5 MG/0.5ML solution auto-injector Inject 5 mg under the skin into the appropriate area as directed 1 (One) Time Per Week. 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   Constitutional:       General: He is not in acute distress.     Appearance: Normal appearance. He is normal weight. He is not ill-appearing.   Eyes: " "     General: No scleral icterus.  Cardiovascular:      Rate and Rhythm: Regular rhythm. Tachycardia present.   Pulmonary:      Effort: Pulmonary effort is normal. No respiratory distress.      Breath sounds: Normal breath sounds. No wheezing.   Skin:     Coloration: Skin is not jaundiced.   Neurological:      Mental Status: He is alert.   Psychiatric:         Mood and Affect: Mood normal.         Behavior: Behavior normal.         Thought Content: Thought content normal.                       ECG 12 Lead    Date/Time: 5/21/2025 9:55 AM  Performed by: Bakari Quevedo DO    Authorized by: Bakari Quevedo DO  Comparison: compared with previous ECG from 8/7/2024  Rhythm: sinus tachycardia  Rate: tachycardic  Rate comments: 105  Other findings: non-specific ST-T wave changes    Clinical impression: non-specific ECG             ASSESSMENT & PLAN     Diagnoses and all orders for this visit:    1. Chronic gout of multiple sites, unspecified cause (Primary)  -no flare in over 20 years. Not requiring medication currently.   Lab Results   Component Value Date    URICACID 5.1 02/07/2024     -last uric acid less than 6 without need for medication therapy. Recheck today for continued annual monitoring.  -     Uric acid    2. Type 2 diabetes mellitus with hyperglycemia, without long-term current use of insulin  -A1c trends on file for this patient:   A1C Last 3 Results          8/7/2024    10:28 2/12/2025    11:30   HGBA1C Last 3 Results   Hemoglobin A1C 7.60  9.20      -Goal A1c for this patient is less than 6.5%  -Current diabetes regimen: Prescribed pioglitazone 30 mg daily, metformin 1000 mg twice daily and at last visit Mounjaro was started and he has increased up to 5 mg weekly. States he has not been taking pioglitazone or metformin since last visit because he thought he was supposed to stop it. Blood sugar fasting is around 110. States he has lost 16 lbs since last visit. States he notices his appetite decreased \"I have to " "force myself to eat, nothing tastes good\". He has felt some lightheadedness.   -Changes made to diabetes regimen today: recheck A1c today after start of Mounjaro. Advised pt I would like him to be on metformin but I am OK with him staying off pioglitazone for now. Advised pt he needs to drink a high protein shake containing at least 20 g of protein for any meal that he is skipping because of decreased appetite on Mounjaro. Further plan based off A1c result.  -Diabetic kidney disease screening: up to date and positive- pt already on ACEi/ARB. He has had improvement to near normalization of his urine microalbumin to creatinine ratio on his current regimen.  If this worsened in the future we could consider SGLT2 inhibitor.    3. Insomnia, unspecified type  4. High risk medication use  -at last visit increased Dayvigo from 5 mg to 10 mg nightly. issue is with both falling asleep and staying asleep. He can lay in bed for 3-4 hours without falling asleep. He wakes up up to 2 times nightly . No improvement with Dayvigo 10 mg nightly. Slept 6 hours in last 2 days. On average he sleeps 4.5 hours nightly on average. States he was on trazodone in the past \"made me tired toward the end of the day\". CBI-I therapy discussed last visit \"I thought about it\" but did not enroll.   -stressed importance of CBT- I therapy.  Advised him that while medications for insomnia are used long-term, none of these medications are approved for long-term use.  Using CBT-I therapy could be a way to minimize the need for medications.  -Given failure of trazodone in the past and now Dayvigo I introduced eszopiclone.  We discussed that this medication is a controlled substance like Dayvigo and is associated with possibility of dependence.  We discussed the risk of complex sleep behaviors and to report these immediately if they occur.  We discussed more common side effects including headache, daytime fatigue, dizziness.  Given the severity of symptoms " he is agreeable to a trial of therapy.  Prieto reviewed.  Urine drug screen will be obtained today.  I will start eszopiclone at 1 mg nightly taken immediately before bedtime and asked him to alert me after 1 month of treatment how his symptoms have changed.  We can consider titration at that time.  -     Compliance Drug Analysis, Ur - Urine, Clean Catch  -     eszopiclone (LUNESTA) 1 MG tablet; Take 1 tablet by mouth Every Night. Take immediately before bedtime  Dispense: 30 tablet; Refill: 0    5. Tachycardia  -incidental finding on exam with , persistent after rest.   -EKG in office with sinus tachycardia, nonspecific ST-T changes. Overall nonspecific. Given his dizziness sensation I do recommend obtaining TTE and event monitor (Zio) that was ordered last August by Dr Gary for dizziness symptoms. He did not complete those tests at that time but I did reorder them today and stressed the importance of these tests to eval for underlying rhythm/structural abnormalities of the heart. He is agreeable.            Follow Up  Return in about 3 months (around 8/21/2025) for Recheck.    Patient/family had no further questions at this time and verbalized understanding of the plan discussed today.

## 2025-05-21 NOTE — LETTER
Controlled Substance Prescribing Agreement          I, Zenon Kahn [PATIENT],  1965 [] a patient of  Bakari Quevedo DO   [PROVIDER] at [facility] [PRACTICE], have been informed that  individuals who are prescribed certain Controlled Substances including, but not limited to, narcotic pain medicines, stimulants, benzodiazepine tranquilizers, and barbiturate sedatives, can abuse those substances or may allow abuse by others, and have some risk of developing an addictive disorder or suffering a relapse of a prior addiction. Therefore, I have been informed that it is necessary to observe strict rules pertaining to their use, and I agree to follow the terms and procedures described in this Agreement as consideration for, and as a condition of, the willingness of the physician whose signature appears below to consider prescribing or to continue prescribing Controlled Substances to treat my pain.     1. I will inform my physician of any current or past substance abuse, or any current or past substance abuse of any immediate member of my immediate family.     2. I agree that I may be subject to a voluntary evaluation by psychologists and/or psychiatrists, possibly at my own expense, before any Controlled Substances will be prescribed to me. I agree that the need to be evaluated by psychologists and/or psychiatrists may be revisited every three (3) to six (6) months thereafter while taking the medication.     3. All Controlled Substances must come from a provider in the PROVIDER’S PRACTICE. My Controlled Substances will come from the PROVIDER whose signature appears below, or during his or her absence, by the covering provider, unless specific written authorization is obtained from the office for an exception.     4. I will obtain all Controlled Substances from the same pharmacy. Should the need arise to change pharmacies, I will inform the PROVIDER’S office.     5. I will inform the  PROVIDER’S office of any new medications or medical conditions, and of any adverse effects I experience from any of the medications that I take.     6. I will inform my other health care providers that I am taking the Controlled Substances listed above, and of the existence of this Agreement. In the event of an emergency, I will provide the foregoing information to emergency department providers.     7. I agree that my prescribing PROVIDER has permission to discuss all diagnostic and treatment details with other health care providers, pharmacists, or other professionals who provide my health care regarding my use of Controlled Substances for purposes of maintaining accountability.     8. I will not allow anyone else to have, use sell, or otherwise have access to these medications. The sharing of medications with anyone is absolutely forbidden and is against the law.         9. I understand that Controlled Substances may be hazardous or lethal to a person who is not tolerant to their effects, especially a child, and that I must keep them out of reach of such people for their own safety.     10. I understand that tampering with a written prescription is a felony and I will not change or tamper with the PROVIDER’S written prescription.     11. I am aware that attempting to obtain a Controlled Substance under false pretenses is illegal.     12. I agree not to alter my medication in any way, and I will take my medication whole, and it will not be broken, chewed, crushed, injected, or snorted.     13. I will take my medication as instructed and prescribed, and I will not exceed the maximum prescribed dose. Any change in dosage must be approved by the PROVIDER or a physician within the PRACTICE.     14. I understand that these drugs should not be stopped abruptly, as withdrawal syndromes may develop.     15. I will cooperate with unannounced urine or serum toxicology screenings as may be requested, as well as any random  pill counts of medication by the PROVIDER. Failure to comply may result in termination of the PROVIDER-patient relationship.     16. I understand that the presence of unauthorized and/or illegal substances in the screenings described in the paragraph above may prompt referral for assessment for a substance abuse disorder or termination of the PROVIDER-patient relationship.     17. I understand that medications may not be replaced if they are lost, damaged, or stolen. If any of these situations arise that cause me to request an early refill of my medication, a copy of a filed police report or a statement from me explaining the circumstances may be required before additional prescriptions are considered. If I request an early refill secondary to lost, damaged, or stolen prescriptions twice within a year, I may be discharged from the practice.     18. I understand that a prescription may be given early if the PROVIDER or the patient will be out of town when the refill is due. These prescriptions will contain instructions to the pharmacist that the prescriptions(s) may not be filled prior to the appropriate date.     19. If the responsible legal authorities have questions concerning my treatment, as may occur, for example, if I obtained medication at several pharmacies, all confidentiality is waived, and these authorities may be given full access to my full records of Controlled Substances administration.     20. I will keep my scheduled appointments in order to receive medication renewals. If I need to cancel my appointment, I will do so a minimum of twenty-four (24) hours before it is scheduled.     21. I understand that I may be asked to bring my medications in their original container to the PROVIDER’s office while I am on controlled medication.     22. Refills generally will not be given over the phone, after office hours, during the weekends, and on holidays.     23. I understand that any medical treatment is  initially a trial, with the goal of treatment being to improve the quality of life and ability to function and/or work. These parameters will be assessed periodically to determine the benefits of continued therapy, and continued prescription is contingent on whether my physician believes that the medication usage benefits me. I will comply with all treatments as outlined by the PROVIDER.     24. I have been explained the risks and potential benefits of these therapies, including, but not limited to, psychological addiction, physical dependence, withdrawal and over dosage.     25. I understand that failure to adhere to these policies and/or failure to comply with the PROVIDER’S treatment plan may result in cessation of therapy with Controlled Substance prescribing by the PROVIDER or referral for further specialty assessment, as well as possible discharge from the PRACTICE.     26. I, the undersigned patient, attest that the foregoing was discussed with me, and that I have read, fully understand, and agree to all of the above requirements and instructions. I affirm that I have the full right and power to sign and be bound by this  Agreement.         Date:  __________________________________________    Time:  __________________________________________    Patient Printed Name:  _____________________________    Patient Signature:  ________________________________           Date:  __________________________________________    Time:  __________________________________________    Provider Signature:  _______________________________

## 2025-05-29 LAB
DRUGS UR: NORMAL
HBA1C MFR BLD: 7.1 % (ref 4.8–5.6)
URATE SERPL-MCNC: 6 MG/DL (ref 3.8–8.4)

## 2025-06-05 ENCOUNTER — TELEPHONE (OUTPATIENT)
Dept: CARDIOLOGY | Age: 60
End: 2025-06-05
Payer: COMMERCIAL

## 2025-06-06 ENCOUNTER — HOSPITAL ENCOUNTER (OUTPATIENT)
Dept: CARDIOLOGY | Facility: HOSPITAL | Age: 60
Discharge: HOME | End: 2025-06-06
Admitting: STUDENT IN AN ORGANIZED HEALTH CARE EDUCATION/TRAINING PROGRAM
Payer: COMMERCIAL

## 2025-06-06 VITALS
WEIGHT: 170 LBS | SYSTOLIC BLOOD PRESSURE: 100 MMHG | DIASTOLIC BLOOD PRESSURE: 65 MMHG | BODY MASS INDEX: 24.34 KG/M2 | HEIGHT: 70 IN | HEART RATE: 104 BPM

## 2025-06-06 LAB
AORTIC ARCH: 1.8 CM
AORTIC DIMENSIONLESS INDEX: 0.75 (DI)
ASCENDING AORTA: 3.7 CM
AV MEAN PRESS GRAD SYS DOP V1V2: 2 MMHG
AV VMAX SYS DOP: 94.1 CM/SEC
BH CV ECHO LEFT VENTRICLE GLOBAL LONGITUDINAL STRAIN: -11.1 %
BH CV ECHO MEAS - ACS: 2.8 CM
BH CV ECHO MEAS - AO MAX PG: 3.5 MMHG
BH CV ECHO MEAS - AO ROOT AREA (BSA CORRECTED): 2 CM2
BH CV ECHO MEAS - AO ROOT DIAM: 3.9 CM
BH CV ECHO MEAS - AO V2 VTI: 16.7 CM
BH CV ECHO MEAS - AVA(I,D): 3.7 CM2
BH CV ECHO MEAS - EDV(CUBED): 98.3 ML
BH CV ECHO MEAS - EDV(MOD-SP2): 140 ML
BH CV ECHO MEAS - EDV(MOD-SP4): 135 ML
BH CV ECHO MEAS - EF(MOD-SP2): 47.9 %
BH CV ECHO MEAS - EF(MOD-SP4): 44.4 %
BH CV ECHO MEAS - ESV(CUBED): 48.1 ML
BH CV ECHO MEAS - ESV(MOD-SP2): 73 ML
BH CV ECHO MEAS - ESV(MOD-SP4): 75 ML
BH CV ECHO MEAS - FS: 21.2 %
BH CV ECHO MEAS - IVS/LVPW: 1 CM
BH CV ECHO MEAS - IVSD: 0.96 CM
BH CV ECHO MEAS - LAT PEAK E' VEL: 4.9 CM/SEC
BH CV ECHO MEAS - LV DIASTOLIC VOL/BSA (35-75): 68.5 CM2
BH CV ECHO MEAS - LV MASS(C)D: 151.7 GRAMS
BH CV ECHO MEAS - LV MAX PG: 2.21 MMHG
BH CV ECHO MEAS - LV MEAN PG: 1 MMHG
BH CV ECHO MEAS - LV SYSTOLIC VOL/BSA (12-30): 38 CM2
BH CV ECHO MEAS - LV V1 MAX: 74.4 CM/SEC
BH CV ECHO MEAS - LV V1 VTI: 12.5 CM
BH CV ECHO MEAS - LVIDD: 4.6 CM
BH CV ECHO MEAS - LVIDS: 3.6 CM
BH CV ECHO MEAS - LVOT AREA: 5 CM2
BH CV ECHO MEAS - LVOT DIAM: 2.5 CM
BH CV ECHO MEAS - LVPWD: 0.96 CM
BH CV ECHO MEAS - MED PEAK E' VEL: 4 CM/SEC
BH CV ECHO MEAS - MV A DUR: 0.1 SEC
BH CV ECHO MEAS - MV A MAX VEL: 89.2 CM/SEC
BH CV ECHO MEAS - MV DEC SLOPE: 1341 CM/SEC2
BH CV ECHO MEAS - MV DEC TIME: 0.05 SEC
BH CV ECHO MEAS - MV E MAX VEL: 41.2 CM/SEC
BH CV ECHO MEAS - MV E/A: 0.46
BH CV ECHO MEAS - MV MAX PG: 3.8 MMHG
BH CV ECHO MEAS - MV MEAN PG: 1.76 MMHG
BH CV ECHO MEAS - MV P1/2T: 24 MSEC
BH CV ECHO MEAS - MV V2 VTI: 13.6 CM
BH CV ECHO MEAS - MVA(P1/2T): 9.2 CM2
BH CV ECHO MEAS - MVA(VTI): 4.6 CM2
BH CV ECHO MEAS - PA ACC TIME: 0.07 SEC
BH CV ECHO MEAS - PA V2 MAX: 74.8 CM/SEC
BH CV ECHO MEAS - PULM A REVS DUR: 0.09 SEC
BH CV ECHO MEAS - PULM A REVS VEL: 25.7 CM/SEC
BH CV ECHO MEAS - PULM DIAS VEL: 35.9 CM/SEC
BH CV ECHO MEAS - PULM S/D: 1.14
BH CV ECHO MEAS - PULM SYS VEL: 40.7 CM/SEC
BH CV ECHO MEAS - QP/QS: 0.54
BH CV ECHO MEAS - RAP SYSTOLE: 3 MMHG
BH CV ECHO MEAS - RV MAX PG: 1.72 MMHG
BH CV ECHO MEAS - RV V1 MAX: 65.6 CM/SEC
BH CV ECHO MEAS - RV V1 VTI: 11 CM
BH CV ECHO MEAS - RVOT DIAM: 1.98 CM
BH CV ECHO MEAS - RVSP: 20 MMHG
BH CV ECHO MEAS - SUP REN AO DIAM: 2 CM
BH CV ECHO MEAS - SV(LVOT): 62.4 ML
BH CV ECHO MEAS - SV(MOD-SP2): 67 ML
BH CV ECHO MEAS - SV(MOD-SP4): 60 ML
BH CV ECHO MEAS - SV(RVOT): 33.7 ML
BH CV ECHO MEAS - SVI(LVOT): 31.6 ML/M2
BH CV ECHO MEAS - SVI(MOD-SP2): 34 ML/M2
BH CV ECHO MEAS - SVI(MOD-SP4): 30.4 ML/M2
BH CV ECHO MEAS - TAPSE (>1.6): 2.07 CM
BH CV ECHO MEAS - TR MAX PG: 17.2 MMHG
BH CV ECHO MEAS - TR MAX VEL: 207.5 CM/SEC
BH CV ECHO MEASUREMENTS AVERAGE E/E' RATIO: 9.26
BH CV XLRA - RV BASE: 3.3 CM
BH CV XLRA - RV LENGTH: 6.3 CM
BH CV XLRA - RV MID: 2.9 CM
BH CV XLRA - TDI S': 9.9 CM/SEC
LEFT ATRIUM VOLUME INDEX: 19.4 ML/M2
LV EF BIPLANE MOD: 45.2 %
SINUS: 4.1 CM
STJ: 3 CM

## 2025-06-06 PROCEDURE — 25510000001 PERFLUTREN 6.52 MG/ML SUSPENSION 2 ML VIAL: Performed by: STUDENT IN AN ORGANIZED HEALTH CARE EDUCATION/TRAINING PROGRAM

## 2025-06-06 PROCEDURE — 93306 TTE W/DOPPLER COMPLETE: CPT

## 2025-06-06 PROCEDURE — 93356 MYOCRD STRAIN IMG SPCKL TRCK: CPT

## 2025-06-06 RX ADMIN — PERFLUTREN 2 ML: 6.52 INJECTION, SUSPENSION INTRAVENOUS at 15:05

## 2025-06-08 DIAGNOSIS — E11.65 TYPE 2 DIABETES MELLITUS WITH HYPERGLYCEMIA, WITHOUT LONG-TERM CURRENT USE OF INSULIN: ICD-10-CM

## 2025-06-08 DIAGNOSIS — I10 ESSENTIAL HYPERTENSION: ICD-10-CM

## 2025-06-10 RX ORDER — AMLODIPINE AND VALSARTAN 5; 320 MG/1; MG/1
1 TABLET ORAL DAILY
Qty: 90 TABLET | Refills: 1 | Status: SHIPPED | OUTPATIENT
Start: 2025-06-10

## 2025-06-10 RX ORDER — TIRZEPATIDE 2.5 MG/.5ML
INJECTION, SOLUTION SUBCUTANEOUS
Qty: 2 ML | Refills: 0 | OUTPATIENT
Start: 2025-06-10

## 2025-06-12 ENCOUNTER — HOSPITAL ENCOUNTER (OUTPATIENT)
Dept: CARDIOLOGY | Facility: HOSPITAL | Age: 60
Discharge: HOME | End: 2025-06-12
Admitting: STUDENT IN AN ORGANIZED HEALTH CARE EDUCATION/TRAINING PROGRAM
Payer: COMMERCIAL

## 2025-06-12 PROCEDURE — 93246 EXT ECG>7D<15D RECORDING: CPT

## 2025-06-16 ENCOUNTER — TELEPHONE (OUTPATIENT)
Dept: INTERNAL MEDICINE | Facility: CLINIC | Age: 60
End: 2025-06-16

## 2025-06-16 NOTE — TELEPHONE ENCOUNTER
"  Caller: Zenon Kahn \"Claude\"    Relationship: Self    Best call back number:     [phone]         What was the call regarding:     PATIENT IS REQUESTING A CALL BACK FROM DR CARVER.  HE HAS SOME QUESTIONS/CONCERNS REGARDING HIS ECHO CARDIO GRAM RESULTS THAT HE RECENTLY HAD DONE.    PLEASE RETURN HIS CALL.    "

## 2025-06-23 DIAGNOSIS — E11.65 TYPE 2 DIABETES MELLITUS WITH HYPERGLYCEMIA, WITHOUT LONG-TERM CURRENT USE OF INSULIN: ICD-10-CM

## 2025-06-24 RX ORDER — TIRZEPATIDE 2.5 MG/.5ML
INJECTION, SOLUTION SUBCUTANEOUS
Qty: 2 ML | Refills: 0 | OUTPATIENT
Start: 2025-06-24

## 2025-07-02 LAB
CV ZIO BASELINE AVG BPM: 111 BPM
CV ZIO BASELINE BPM HIGH: 188 BPM
CV ZIO BASELINE BPM LOW: 79 BPM
CV ZIO DEVICE ANALYSIS TIME: NORMAL
CV ZIO ECT SVE COUNT: 2119 EPISODES
CV ZIO ECT SVE CPLT COUNT: 58 EPISODES
CV ZIO ECT SVE CPLT FREQ: NORMAL
CV ZIO ECT SVE FREQ: NORMAL
CV ZIO ECT SVE TPLT COUNT: 6 EPISODES
CV ZIO ECT SVE TPLT FREQ: NORMAL
CV ZIO ECT VE COUNT: 1177 EPISODES
CV ZIO ECT VE CPLT COUNT: 0 EPISODES
CV ZIO ECT VE CPLT FREQ: 0
CV ZIO ECT VE FREQ: NORMAL
CV ZIO ECT VE TPLT COUNT: 0 EPISODES
CV ZIO ECT VE TPLT FREQ: 0
CV ZIO ECTOPIC SVE COUPLET RAW PERCENT: 0.01 %
CV ZIO ECTOPIC SVE ISOLATED PERCENT: 0.21 %
CV ZIO ECTOPIC SVE TRIPLET RAW PERCENT: 0 %
CV ZIO ECTOPIC VE COUPLET RAW PERCENT: 0 %
CV ZIO ECTOPIC VE ISOLATED PERCENT: 0.12 %
CV ZIO ECTOPIC VE TRIPLET RAW PERCENT: 0 %
CV ZIO ENROLLMENT END: NORMAL
CV ZIO ENROLLMENT START: NORMAL
CV ZIO PATIENT EVENTS DIARIES: 3
CV ZIO PATIENT EVENTS TRIGGERS: 5
CV ZIO PAUSE COUNT: 0
CV ZIO PRESCRIPTION STATUS: NORMAL
CV ZIO SVT AVG BPM: 138 BPM
CV ZIO SVT BPM HIGH: 188 BPM
CV ZIO SVT BPM LOW: 97 BPM
CV ZIO SVT COUNT: 3
CV ZIO SVT F EPI AVG BPM: 159 BPM
CV ZIO SVT F EPI BEATS: 25 BEATS
CV ZIO SVT F EPI BPM HIGH: 188 BPM
CV ZIO SVT F EPI BPM LOW: 98 BPM
CV ZIO SVT F EPI DUR: 9.7 SEC
CV ZIO SVT F EPI END: NORMAL
CV ZIO SVT F EPI START: NORMAL
CV ZIO SVT L EPI AVG BPM: 159 BPM
CV ZIO SVT L EPI BEATS: 25 BEATS
CV ZIO SVT L EPI BPM HIGH: 188 BPM
CV ZIO SVT L EPI BPM LOW: 98 BPM
CV ZIO SVT L EPI DUR: 9.7 SEC
CV ZIO SVT L EPI END: NORMAL
CV ZIO SVT L EPI START: NORMAL
CV ZIO TOTAL  ENROLLMENT PERIOD: NORMAL
CV ZIO VT COUNT: 0

## 2025-08-28 DIAGNOSIS — E11.65 TYPE 2 DIABETES MELLITUS WITH HYPERGLYCEMIA, WITHOUT LONG-TERM CURRENT USE OF INSULIN: ICD-10-CM

## 2025-08-28 RX ORDER — TIRZEPATIDE 5 MG/.5ML
INJECTION, SOLUTION SUBCUTANEOUS
Qty: 2 ML | Refills: 1 | Status: SHIPPED | OUTPATIENT
Start: 2025-08-28

## (undated) DEVICE — TUBING, SUCTION, 1/4" X 10', STRAIGHT: Brand: MEDLINE

## (undated) DEVICE — THE SINGLE USE ETRAP – POLYP TRAP IS USED FOR SUCTION RETRIEVAL OF ENDOSCOPICALLY REMOVED POLYPS.: Brand: ETRAP

## (undated) DEVICE — KT ORCA ORCAPOD DISP STRL

## (undated) DEVICE — SUT ETHLN 4/0 PS2 PLSTC 1667G

## (undated) DEVICE — ADAPT CLN BIOGUARD AIR/H2O DISP

## (undated) DEVICE — SUT ETHLN 2/0 FS 18IN 664H

## (undated) DEVICE — THE CARR-LOCKE INJECTION NEEDLE IS A SINGLE USE, DISPOSABLE, FLEXIBLE SHEATH INJECTION NEEDLE USED FOR THE INJECTION OF VARIOUS TYPES OF MEDIA THROUGH FLEXIBLE ENDOSCOPES.

## (undated) DEVICE — LN SMPL CO2 SHTRM SD STREAM W/M LUER

## (undated) DEVICE — SKIN PREP TRAY 4 COMPARTM TRAY: Brand: MEDLINE INDUSTRIES, INC.

## (undated) DEVICE — SINGLE-USE BIOPSY FORCEPS: Brand: RADIAL JAW 4

## (undated) DEVICE — CANN O2 ETCO2 FITS ALL CONN CO2 SMPL A/ 7IN DISP LF

## (undated) DEVICE — SNAR POLYP CAPTIVATOR HEX 27MM 240CM

## (undated) DEVICE — SPONGE,LAP,18"X18",DLX,XR,ST,5/PK,40/PK: Brand: MEDLINE

## (undated) DEVICE — Device: Brand: SPOT EX ENDOSCOPIC TATTOO

## (undated) DEVICE — DRSNG GZ CURAD XEROFORM NONADHR OVERWRAP 5X9IN

## (undated) DEVICE — DRSNG PAD ABD 8X10IN STRL

## (undated) DEVICE — SNAR POLYP SENSATION STDOVL 27 240 BX40

## (undated) DEVICE — SENSR O2 OXIMAX FNGR A/ 18IN NONSTR

## (undated) DEVICE — GLV SURG SENSICARE PI LF PF 6.5

## (undated) DEVICE — NDL HYPO PRECISIONGLIDE REG 25G 1 1/2

## (undated) DEVICE — LAG MINOR PROCEDURE: Brand: MEDLINE INDUSTRIES, INC.

## (undated) DEVICE — ANTIBACTERIAL UNDYED BRAIDED (POLYGLACTIN 910), SYNTHETIC ABSORBABLE SUTURE: Brand: COATED VICRYL